# Patient Record
Sex: FEMALE | Race: WHITE | NOT HISPANIC OR LATINO | Employment: FULL TIME | URBAN - METROPOLITAN AREA
[De-identification: names, ages, dates, MRNs, and addresses within clinical notes are randomized per-mention and may not be internally consistent; named-entity substitution may affect disease eponyms.]

---

## 2017-06-09 DIAGNOSIS — L71.0 PERIORAL DERMATITIS: ICD-10-CM

## 2017-06-09 NOTE — TELEPHONE ENCOUNTER
Metrogel -- in history  Last Written Prescription Date: 05/26/2016 -- states D/C reason -- stopped prior to admission or erroneous entry time before that 04/27/2010  Last Fill Quantity: 1 tube,  # refills: 0   Last Office Visit with [facility] or [facility] prescribing provider: 10/03/2016

## 2017-06-12 NOTE — TELEPHONE ENCOUNTER
Reason for Call: Patient called back and would like another call back from triage    Best phone number to reach pt at is: [phone]  Ok to leave a message with medical info? YES    Pharmacy preferred (if calling for a refill): [facility]  [facility]-Patient Representative

## 2017-06-12 NOTE — TELEPHONE ENCOUNTER
Why does pt need this?     Please call pt to discuss     Thank you     Elvia Miller RN, BSN  [facility]

## 2017-06-13 RX ORDER — METRONIDAZOLE 10 MG/G
GEL TOPICAL
Qty: 60 G | Refills: 0 | Status: SHIPPED | OUTPATIENT
Start: 2017-06-13 | End: 2018-08-28

## 2017-06-13 NOTE — TELEPHONE ENCOUNTER
Patient called back.  She said she uses this daily for perioral dermatitis.  She said JV has ordered this in the past for her.      Routing refill request to provider for review/approval because:  Drug not active on patient's medication list          JORDAN Childress, RN, PHN  [facility]

## 2018-08-28 ENCOUNTER — OFFICE VISIT (OUTPATIENT)
Dept: FAMILY MEDICINE | Facility: CLINIC | Age: 38
End: 2018-08-28
Payer: COMMERCIAL

## 2018-08-28 ENCOUNTER — RADIANT APPOINTMENT (OUTPATIENT)
Dept: GENERAL RADIOLOGY | Facility: CLINIC | Age: 38
End: 2018-08-28
Attending: PHYSICIAN ASSISTANT
Payer: COMMERCIAL

## 2018-08-28 VITALS
WEIGHT: 101 LBS | DIASTOLIC BLOOD PRESSURE: 80 MMHG | HEIGHT: 64 IN | SYSTOLIC BLOOD PRESSURE: 118 MMHG | OXYGEN SATURATION: 100 % | BODY MASS INDEX: 17.24 KG/M2 | HEART RATE: 94 BPM | TEMPERATURE: 98.6 F

## 2018-08-28 DIAGNOSIS — L71.0 PERIORAL DERMATITIS: ICD-10-CM

## 2018-08-28 DIAGNOSIS — S99.922A FOOT INJURY, LEFT, INITIAL ENCOUNTER: ICD-10-CM

## 2018-08-28 DIAGNOSIS — S99.922A FOOT INJURY, LEFT, INITIAL ENCOUNTER: Primary | ICD-10-CM

## 2018-08-28 PROCEDURE — 99213 OFFICE O/P EST LOW 20 MIN: CPT | Performed by: PHYSICIAN ASSISTANT

## 2018-08-28 PROCEDURE — 73630 X-RAY EXAM OF FOOT: CPT | Mod: LT

## 2018-08-28 RX ORDER — METRONIDAZOLE 10 MG/G
GEL TOPICAL
Qty: 60 G | Refills: 0 | Status: ON HOLD | OUTPATIENT
Start: 2018-08-28 | End: 2023-11-13

## 2018-08-28 NOTE — PROGRESS NOTES
"  SUBJECTIVE:                                                    Anna Smith is a 38 year old female who presents to clinic today for the following health issues:      Joint Pain    Onset: this morning    Description:   Location: left foot  Character: Sharp and Dull ache    Intensity: moderate    Progression of Symptoms: worse    Accompanying Signs & Symptoms:  Other symptoms: radiation of pain when moves toes, weakness of foot, swelling and redness    History:   Previous similar pain: YES      Precipitating factors:   Trauma or overuse: YES- at work picked up display at work was upside down and dropped a 30 pound metal piece on foot    Alleviating factors:  Improved by: ice    Therapies Tried and outcome: above-minor relief    Problem list and histories reviewed & adjusted, as indicated.  Additional history: as documented      ROS:  Constitutional, HEENT, cardiovascular, pulmonary, GI, , musculoskeletal, neuro, skin, endocrine and psych systems are negative, except as otherwise noted.    OBJECTIVE:                                                    /80 (BP Location: Right arm, Patient Position: Chair, Cuff Size: Adult Regular)  Pulse 94  Temp 98.6  F (37  C) (Oral)  Ht 5' 4\" (1.626 m)  Wt 101 lb (45.8 kg)  SpO2 100%  Breastfeeding? No  BMI 17.34 kg/m2  Body mass index is 17.34 kg/(m^2).  GENERAL: healthy, alert and no distress  EYES: Eyes grossly normal to inspection, PERRL and conjunctivae and sclerae normal  MS: no gross musculoskeletal defects noted, trace edema noted  SKIN: no suspicious lesions or rashes, scrape of skin on top of affected foot no signs of infection noted.    NEURO: Normal strength and tone, mentation intact and speech normal  PSYCH: mentation appears normal, affect normal/bright    Diagnostic Test Results:  Xray - Unremarkable     ASSESSMENT/PLAN:                                                      Anna was seen today for musculoskeletal problem.    Diagnoses and all " orders for this visit:    Foot injury, left, initial encounter  -     XR Foot Left G/E 3 Views; Future    Perioral dermatitis  -     metroNIDAZOLE (METROGEL) 1 % gel; APPLY TOPICALLY DAILY TO TWICE DAILY FOR PERIORAL DERMATITIS      - Patient has normal range of motion and sensation on affected foot.    - XR is unremarkable, no fractures seen.    - Home cares discussed.   - Patient to followup if symptoms do not improve.  She should be seen sooner if needed.     Followup: Return if symptoms worsen or fail to improve.     -- I have discussed the patient's diagnosis, and my plan of treatment with the patient and/or family. Patient is aware to followup if symptoms do not improve.  Patient has been advised to be seen sooner or seek more immediate care if symptoms change or worsen.  Patient agrees with and understands the plan today.     See Patient Instructions        Kusum Foss PA-C    [facility] [place]

## 2018-08-28 NOTE — MR AVS SNAPSHOT
"              After Visit Summary   2018    Anna Smith    MRN: [id number]           Patient Information     Date Of Birth          1980        Visit Information        Provider Department      2018 2:20 PM Kusum Foss PA-C [facility]        Today's Diagnoses     Foot injury, left, initial encounter    -  1    Perioral dermatitis           Follow-ups after your visit        Follow-up notes from your care team     Return if symptoms worsen or fail to improve.      Who to contact     If you have questions or need follow up information about today's clinic visit or your schedule please contact [facility] directly at [phone].  Normal or non-critical lab and imaging results will be communicated to you by MyChart, letter or phone within 4 business days after the clinic has received the results. If you do not hear from us within 7 days, please contact the clinic through MyChart or phone. If you have a critical or abnormal lab result, we will notify you by phone as soon as possible.  Submit refill requests through [employer] or call your pharmacy and they will forward the refill request to us. Please allow 3 business days for your refill to be completed.          Additional Information About Your Visit        MyChart Information     [employer] lets you send messages to your doctor, view your test results, renew your prescriptions, schedule appointments and more. To sign up, go to [email or web address] . Click on \"Log in\" on the left side of the screen, which will take you to the Welcome page. Then click on \"Sign up Now\" on the right side of the page.     You will be asked to enter the access code listed below, as well as some personal information. Please follow the directions to create your username and password.     Your access code is: [email or web address]  Expires: 2018  1:30 PM     Your access code will  in 90 days. If you need help or a new code, " "please call your [place] clinic or [phone].        Care EveryWhere ID     This is your Care EveryWhere ID. This could be used by other organizations to access your [place] medical records  [id number]        Your Vitals Were     Pulse Temperature Height Pulse Oximetry Breastfeeding? BMI (Body Mass Index)    94 98.6  F (37  C) (Oral) 5' 4\" (1.626 m) 100% No 17.34 kg/m2       Blood Pressure from Last 3 Encounters:   08/28/18 118/80   10/03/16 118/78   06/01/16 124/89    Weight from Last 3 Encounters:   08/28/18 101 lb (45.8 kg)   10/03/16 102 lb (46.3 kg)   05/26/16 100 lb 3.2 oz (45.5 kg)                 Where to get your medicines      These medications were sent to [facility] [address] [place] [address] AT [facility] [address] [address]    Hours:  24-hours Phone:  [phone]     metroNIDAZOLE 1 % gel          Primary Care Provider Office Phone # Fax #    Roberto Jones [phone] [address] [facility] [address]        Equal Access to Services     CHIO PARISI AH: Brittany gordono Soshaunaali, waaxda luqadaha, qaybta kaalmada adeegyada, law june. So [facility] [phone].    ATENCIÓN: Si habla español, tiene a camacho disposición servicios gratuitos de asistencia lingüística. ame al [phone].    We comply with applicable federal civil rights laws and [place] laws. We do not discriminate on the basis of race, color, national origin, age, disability, sex, sexual orientation, or gender identity.            Thank you!     Thank you for choosing [facility]  for your care. Our goal is always to provide you with excellent care. Hearing back from our patients is one way we can continue to improve our services. Please take a few minutes to complete the written survey that you may receive in the mail after your visit with us. Thank you!             Your Updated " Medication List - Protect others around you: Learn how to safely use, store and throw away your medicines at www.disposemymeds.org.          This list is accurate as of 8/28/18 11:59 PM.  Always use your most recent med list.                   Brand Name Dispense Instructions for use Diagnosis    metroNIDAZOLE 1 % gel    METROGEL    60 g    APPLY TOPICALLY DAILY TO TWICE DAILY FOR PERIORAL DERMATITIS    Perioral dermatitis

## 2018-11-07 ENCOUNTER — OFFICE VISIT (OUTPATIENT)
Dept: FAMILY MEDICINE | Facility: CLINIC | Age: 38
End: 2018-11-07
Payer: COMMERCIAL

## 2018-11-07 VITALS
TEMPERATURE: 98.6 F | WEIGHT: 100.2 LBS | HEIGHT: 65 IN | OXYGEN SATURATION: 99 % | HEART RATE: 84 BPM | DIASTOLIC BLOOD PRESSURE: 80 MMHG | SYSTOLIC BLOOD PRESSURE: 130 MMHG | BODY MASS INDEX: 16.69 KG/M2

## 2018-11-07 DIAGNOSIS — Z12.4 SCREENING FOR MALIGNANT NEOPLASM OF CERVIX: ICD-10-CM

## 2018-11-07 DIAGNOSIS — Z83.49 FAMILY HISTORY OF THYROID DISEASE: ICD-10-CM

## 2018-11-07 DIAGNOSIS — Z11.4 SCREENING FOR HIV (HUMAN IMMUNODEFICIENCY VIRUS): ICD-10-CM

## 2018-11-07 DIAGNOSIS — Z23 NEED FOR PROPHYLACTIC VACCINATION WITH TETANUS-DIPHTHERIA (TD): ICD-10-CM

## 2018-11-07 DIAGNOSIS — Z00.01 ENCOUNTER FOR ROUTINE ADULT MEDICAL EXAM WITH ABNORMAL FINDINGS: Primary | ICD-10-CM

## 2018-11-07 DIAGNOSIS — Z13.6 CARDIOVASCULAR SCREENING; LDL GOAL LESS THAN 160: ICD-10-CM

## 2018-11-07 DIAGNOSIS — Z23 NEED FOR PROPHYLACTIC VACCINATION AND INOCULATION AGAINST INFLUENZA: ICD-10-CM

## 2018-11-07 LAB
ERYTHROCYTE [DISTWIDTH] IN BLOOD BY AUTOMATED COUNT: 12.7 % (ref 10–15)
HCT VFR BLD AUTO: 45.1 % (ref 35–47)
HGB BLD-MCNC: 15.1 G/DL (ref 11.7–15.7)
MCH RBC QN AUTO: 30.1 PG (ref 26.5–33)
MCHC RBC AUTO-ENTMCNC: 33.5 G/DL (ref 31.5–36.5)
MCV RBC AUTO: 90 FL (ref 78–100)
PLATELET # BLD AUTO: 243 10E9/L (ref 150–450)
RBC # BLD AUTO: 5.01 10E12/L (ref 3.8–5.2)
WBC # BLD AUTO: 4.3 10E9/L (ref 4–11)

## 2018-11-07 PROCEDURE — 90471 IMMUNIZATION ADMIN: CPT | Performed by: FAMILY MEDICINE

## 2018-11-07 PROCEDURE — G0145 SCR C/V CYTO,THINLAYER,RESCR: HCPCS | Performed by: FAMILY MEDICINE

## 2018-11-07 PROCEDURE — 84443 ASSAY THYROID STIM HORMONE: CPT | Performed by: FAMILY MEDICINE

## 2018-11-07 PROCEDURE — 87389 HIV-1 AG W/HIV-1&-2 AB AG IA: CPT | Performed by: FAMILY MEDICINE

## 2018-11-07 PROCEDURE — 80061 LIPID PANEL: CPT | Performed by: FAMILY MEDICINE

## 2018-11-07 PROCEDURE — 87624 HPV HI-RISK TYP POOLED RSLT: CPT | Performed by: FAMILY MEDICINE

## 2018-11-07 PROCEDURE — 90715 TDAP VACCINE 7 YRS/> IM: CPT | Performed by: FAMILY MEDICINE

## 2018-11-07 PROCEDURE — 99395 PREV VISIT EST AGE 18-39: CPT | Mod: 25 | Performed by: FAMILY MEDICINE

## 2018-11-07 PROCEDURE — 85027 COMPLETE CBC AUTOMATED: CPT | Performed by: FAMILY MEDICINE

## 2018-11-07 PROCEDURE — 36415 COLL VENOUS BLD VENIPUNCTURE: CPT | Performed by: FAMILY MEDICINE

## 2018-11-07 PROCEDURE — 80053 COMPREHEN METABOLIC PANEL: CPT | Performed by: FAMILY MEDICINE

## 2018-11-07 ASSESSMENT — ANXIETY QUESTIONNAIRES
6. BECOMING EASILY ANNOYED OR IRRITABLE: NOT AT ALL
3. WORRYING TOO MUCH ABOUT DIFFERENT THINGS: NOT AT ALL
GAD7 TOTAL SCORE: 0
5. BEING SO RESTLESS THAT IT IS HARD TO SIT STILL: NOT AT ALL
2. NOT BEING ABLE TO STOP OR CONTROL WORRYING: NOT AT ALL
7. FEELING AFRAID AS IF SOMETHING AWFUL MIGHT HAPPEN: NOT AT ALL
1. FEELING NERVOUS, ANXIOUS, OR ON EDGE: NOT AT ALL

## 2018-11-07 ASSESSMENT — PATIENT HEALTH QUESTIONNAIRE - PHQ9
SUM OF ALL RESPONSES TO PHQ QUESTIONS 1-9: 0
5. POOR APPETITE OR OVEREATING: NOT AT ALL

## 2018-11-07 NOTE — PROGRESS NOTES
SUBJECTIVE:   CC: Anna Smith is an 38 year old woman who presents for preventive health visit.     Healthy Habits:    Do you get at least three servings of calcium containing foods daily (dairy, green leafy vegetables, etc.)? Yes, tries to    Amount of exercise or daily activities, outside of work: walking and/or running    Problems taking medications regularly No    Medication side effects: No    Have you had an eye exam in the past two years? no    Do you see a dentist twice per year? yes    Do you have sleep apnea, excessive snoring or daytime drowsiness?no      Today's PHQ-2 Score:   PHQ-2 ( 1999 Pfizer) 11/7/2018 8/28/2018   Q1: Little interest or pleasure in doing things 0 0   Q2: Feeling down, depressed or hopeless 0 0   PHQ-2 Score 0 0       Abuse: Current or Past(Physical, Sexual or Emotional)- No  Do you feel safe in your environment - Yes      Social History   Substance Use Topics     Smoking status: Never Smoker     Smokeless tobacco: Never Used     Alcohol use 2.0 oz/week      Comment: 2 per week avg     If you drink alcohol do you typically have >3 drinks per day or >7 drinks per week? No                     Reviewed orders with patient.  Reviewed health maintenance and updated orders accordingly - Yes  BP Readings from Last 3 Encounters:   11/07/18 130/80   08/28/18 118/80   10/03/16 118/78    Wt Readings from Last 3 Encounters:   11/07/18 100 lb 3.2 oz (45.5 kg)   08/28/18 101 lb (45.8 kg)   10/03/16 102 lb (46.3 kg)                  Patient Active Problem List   Diagnosis     CARDIOVASCULAR SCREENING; LDL GOAL LESS THAN 160     Perioral dermatitis     Family history of thyroid disease     Past Surgical History:   Procedure Laterality Date     NO HISTORY OF SURGERY         Social History   Substance Use Topics     Smoking status: Never Smoker     Smokeless tobacco: Never Used     Alcohol use 2.0 oz/week      Comment: 2 per week avg     Family History   Problem Relation Age of Onset      Thyroid Disease Mother      hyperthyroid     HEART DISEASE Father 64     MI at age 64      C.A.D. Paternal Grandfather      Cancer - colorectal Paternal Aunt      Thyroid Disease Sister 33     hypothyroid         Current Outpatient Prescriptions   Medication Sig Dispense Refill     metroNIDAZOLE (METROGEL) 1 % gel APPLY TOPICALLY DAILY TO TWICE DAILY FOR PERIORAL DERMATITIS 60 g 0     Allergies   Allergen Reactions     Erythromycin      Metronidazole      Oral = Rash and burning, topical = fine      No lab results found.     Mammogram not appropriate for this patient based on age.    No results found.      History of abnormal Pap smear: NO - age 30- 65 PAP every 3 years recommended  PAP / HPV 2010   PAP NIL     Reviewed and updated as needed this visit by clinical staff  Tobacco  Allergies  Meds  Med Hx  Surg Hx  Fam Hx  Soc Hx        Reviewed and updated as needed this visit by Provider  Tobacco  Fam Hx  Soc Hx       Past Medical History:   Diagnosis Date     NO ACTIVE PROBLEMS       Past Surgical History:   Procedure Laterality Date     NO HISTORY OF SURGERY       Obstetric History       T0      L0     SAB0   TAB0   Ectopic0   Multiple0   Live Births0           ROS:  CONSTITUTIONAL: NEGATIVE for fever, chills, change in weight  INTEGUMENTARU/SKIN: NEGATIVE for worrisome rashes, moles or lesions  EYES: NEGATIVE for vision changes or irritation  ENT: NEGATIVE for ear, mouth and throat problems  RESP: NEGATIVE for significant cough or SOB  BREAST: NEGATIVE for masses, tenderness or discharge  CV: NEGATIVE for chest pain, palpitations or peripheral edema  GI: NEGATIVE for nausea, abdominal pain, heartburn, or change in bowel habits  : NEGATIVE for unusual urinary or vaginal symptoms. Periods are regular.  MUSCULOSKELETAL: NEGATIVE for significant arthralgias or myalgia  NEURO: NEGATIVE for weakness, dizziness or paresthesias  ENDOCRINE: NEGATIVE for temperature intolerance, skin/hair  "changes  HEME/ALLERGY/IMMUNE: NEGATIVE for bleeding problems  PSYCHIATRIC: NEGATIVE for changes in mood or affect    OBJECTIVE:   /80 (BP Location: Right arm, Patient Position: Chair, Cuff Size: Adult Regular)  Pulse 84  Temp 98.6  F (37  C) (Oral)  Ht 5' 4.5\" (1.638 m)  Wt 100 lb 3.2 oz (45.5 kg)  LMP 10/12/2018 (Exact Date)  SpO2 99%  BMI 16.93 kg/m2  EXAM:  GENERAL: healthy, alert and no distress  EYES: Eyes grossly normal to inspection, PERRL and conjunctivae and sclerae normal  HENT: ear canals and TM's normal, nose and mouth without ulcers or lesions  NECK: no adenopathy, no asymmetry, masses, or scars and thyroid normal to palpation  RESP: lungs clear to auscultation - no rales, rhonchi or wheezes  BREAST: normal without masses, tenderness or nipple discharge and no palpable axillary masses or adenopathy  CV: regular rate and rhythm, normal S1 S2, no S3 or S4, no murmur, click or rub, no peripheral edema and peripheral pulses strong  ABDOMEN: soft, nontender, no hepatosplenomegaly, no masses and bowel sounds normal   (female): normal female external genitalia, normal urethral meatus, vaginal mucosa pink, moist, well rugated, and normal cervix/adnexa/uterus without masses or discharge  MS: no gross musculoskeletal defects noted, no edema  SKIN: no suspicious lesions or rashes  NEURO: Normal strength and tone, mentation intact and speech normal  PSYCH: mentation appears normal, affect normal/bright    See [facility] orders.     ASSESSMENT/PLAN:       ICD-10-CM    1. Encounter for routine adult medical exam with abnormal findings Z00.01 JUST IN CASE   2. CARDIOVASCULAR SCREENING; LDL GOAL LESS THAN 160 Z13.6 Lipid panel reflex to direct LDL Fasting     Comprehensive metabolic panel     CBC with platelets     TSH with free T4 reflex     JUST IN CASE   3. Screening for malignant neoplasm of cervix Z12.4 Pap imaged thin layer screen with HPV - recommended age 30 - 65 years (select HPV order below)     " "HPV High Risk Types DNA Cervical   4. Screening for HIV (human immunodeficiency virus) Z11.4 HIV Screening   5. Need for prophylactic vaccination and inoculation against influenza Z23    6. Need for prophylactic vaccination with tetanus-diphtheria (Td) Z23 TDAP VACCINE (ADACEL)          ADMIN VACCINE, FIRST   7. Family history of thyroid disease Z83.49        COUNSELING:   Reviewed preventive health counseling, as reflected in patient instructions    BP Readings from Last 1 Encounters:   11/07/18 130/80     Estimated body mass index is 16.93 kg/(m^2) as calculated from the following:    Height as of this encounter: 5' 4.5\" (1.638 m).    Weight as of this encounter: 100 lb 3.2 oz (45.5 kg).    BP Screening:   Last 3 BP Readings:    BP Readings from Last 3 Encounters:   11/07/18 130/80   08/28/18 118/80   10/03/16 118/78       The following was recommended to the patient:  Re-screen BP within a year and recommended lifestyle modifications       For the bumps around your forehead: Neutralyze Step 2-clearing serum - blue green clear color - use after cleansing - available at [employer].        reports that she has never smoked. She has never used smokeless tobacco.      Counseling Resources:  ATP IV Guidelines  Pooled Cohorts Equation Calculator  Breast Cancer Risk Calculator  FRAX Risk Assessment  ICSI Preventive Guidelines  Dietary Guidelines for Americans, 2010  USDA's MyPlate  ASA Prophylaxis  Lung CA Screening    Maribeth Sanches MD  [facility] [place]  "

## 2018-11-07 NOTE — MR AVS SNAPSHOT
After Visit Summary   11/7/2018    Anna Smith    MRN: [id number]           Patient Information     Date Of Birth          1980        Visit Information        Provider Department      11/7/2018 10:00 AM Maribeth Sanches MD [facility]        Today's Diagnoses     Encounter for routine adult medical exam with abnormal findings    -  1    CARDIOVASCULAR SCREENING; LDL GOAL LESS THAN 160        Screening for malignant neoplasm of cervix        Screening for HIV (human immunodeficiency virus)        Need for prophylactic vaccination and inoculation against influenza        Need for prophylactic vaccination with tetanus-diphtheria (Td)        Family history of thyroid disease          Care Instructions    For the bumps around your forehead: Neutralyze brand Step 2-clearing serum - blue green clear color - use after cleansing - available at [employer] - also available at Neutralyze.com.     Start a multivitamin or prenatal vitamin with 800-1000mcg of folic acid daily.         Preventive Health Recommendations  Female Ages 26 - 39  Yearly exam:   See your health care provider every year in order to    Review health changes.     Discuss preventive care.      Review your medicines if you your doctor has prescribed any.    Until age 30: Get a Pap test every three years (more often if you have had an abnormal result).    After age 30: Talk to your doctor about whether you should have a Pap test every 3 years or have a Pap test with HPV screening every 5 years.   You do not need a Pap test if your uterus was removed (hysterectomy) and you have not had cancer.  You should be tested each year for STDs (sexually transmitted diseases), if you're at risk.   Talk to your provider about how often to have your cholesterol checked.  If you are at risk for diabetes, you should have a diabetes test (fasting glucose).  Shots: Get a flu shot each year. Get a tetanus shot every 10 years.    Nutrition:     Eat at least 5 servings of fruits and vegetables each day.    Eat whole-grain bread, whole-wheat pasta and brown rice instead of white grains and rice.    Get adequate Calcium and Vitamin D.     Lifestyle    Exercise at least 150 minutes a week (30 minutes a day, 5 days of the week). This will help you control your weight and prevent disease.    Limit alcohol to one drink per day.    No smoking.     Wear sunscreen to prevent skin cancer.    See your dentist every six months for an exam and cleaning.               Thank you for choosing [facility]  for your Health Care. It was a pleasure seeing you at your visit today. Please contact us with any questions or concerns you may have.                   Maribeth Sanches MD                                  To reach your [facility] care team after hours call:   [phone]    Our clinic hours are:     Monday- 7:30 am - 7:00 pm                             Tuesday through Friday- 7:30 am - 5:00 pm                                        Saturday- 8:00 am - 12:00 pm                  Phone:  [phone]    Our pharmacy hours are:     Monday  8:00 am to 7:00 pm      Tuesday through Friday 8:00am to 6:00pm                        Saturday - 9:00 am to 1:00 pm      Sunday : Closed.              Phone:  [phone]      There is also information available at our web site:  [email or web address]    If your provider ordered any lab tests and you do not receive the results within 10 business days, please call the clinic.    If you need a medication refill please contact your pharmacy.  Please allow 2 business days for your refill to be completed.    Our clinic offers telephone visits and e visits.  Please ask one of your team members to explain more.      Use [employer] (secure email communication and access to your chart) to send your primary care provider a message or make an appointment. Ask someone on your Team how to sign up  "for [employer].                         Follow-ups after your visit        Follow-up notes from your care team     Return in about 1 year (around 2019) for Physical Exam, Lab Work, Routine Visit.      Who to contact     If you have questions or need follow up information about today's clinic visit or your schedule please contact [facility] directly at [phone].  Normal or non-critical lab and imaging results will be communicated to you by MyChart, letter or phone within 4 business days after the clinic has received the results. If you do not hear from us within 7 days, please contact the clinic through [employer] or phone. If you have a critical or abnormal lab result, we will notify you by phone as soon as possible.  Submit refill requests through [employer] or call your pharmacy and they will forward the refill request to us. Please allow 3 business days for your refill to be completed.          Additional Information About Your Visit        [employer] Information     [employer] lets you send messages to your doctor, view your test results, renew your prescriptions, schedule appointments and more. To sign up, go to [email or web address] . Click on \"Log in\" on the left side of the screen, which will take you to the Welcome page. Then click on \"Sign up Now\" on the right side of the page.     You will be asked to enter the access code listed below, as well as some personal information. Please follow the directions to create your username and password.     Your access code is: [email or web address]  Expires: 2019 11:06 AM     Your access code will  in 90 days. If you need help or a new code, please call your [place] clinic or [phone].        Care EveryWhere ID     This is your Care EveryWhere ID. This could be used by other organizations to access your [place] medical records  [id number]        Your Vitals Were     Pulse Temperature Height Last Period Pulse Oximetry BMI (Body Mass Index)    84 " "98.6  F (37  C) (Oral) 5' 4.5\" (1.638 m) 10/12/2018 (Exact Date) 99% 16.93 kg/m2       Blood Pressure from Last 3 Encounters:   11/07/18 130/80   08/28/18 118/80   10/03/16 118/78    Weight from Last 3 Encounters:   11/07/18 100 lb 3.2 oz (45.5 kg)   08/28/18 101 lb (45.8 kg)   10/03/16 102 lb (46.3 kg)              We Performed the Following          ADMIN VACCINE, FIRST     CBC with platelets     Comprehensive metabolic panel     HIV Screening     HPV High Risk Types DNA Cervical     JUST IN CASE     Lipid panel reflex to direct LDL Fasting     Pap imaged thin layer screen with HPV - recommended age 30 - 65 years (select HPV order below)     TDAP VACCINE (ADACEL)     TSH with free T4 reflex        Primary Care Provider Office Phone # Fax #    Roberto Jones [phone]       [address] [facility] [address]        Equal Access to Services     YOVANNY PARISI : Hadii aad ku hadasho Soomaali, waaxda luqadaha, qaybta kaalmada adeegyada, waxay jadonin hayjacquelin yap . So [facility] [phone].    ATENCIÓN: Si habla español, tiene a camacho disposición servicios gratuitos de asistencia lingüística. Llame al [phone].    We comply with applicable federal civil rights laws and [place] laws. We do not discriminate on the basis of race, color, national origin, age, disability, sex, sexual orientation, or gender identity.            Thank you!     Thank you for choosing [facility]  for your care. Our goal is always to provide you with excellent care. Hearing back from our patients is one way we can continue to improve our services. Please take a few minutes to complete the written survey that you may receive in the mail after your visit with us. Thank you!             Your Updated Medication List - Protect others around you: Learn how to safely use, store and throw away your medicines at www.disposemymeds.org.          This list is accurate as of 11/7/18 11:18 AM.  Always use " your most recent med list.                   Brand Name Dispense Instructions for use Diagnosis    metroNIDAZOLE 1 % gel    METROGEL    60 g    APPLY TOPICALLY DAILY TO TWICE DAILY FOR PERIORAL DERMATITIS    Perioral dermatitis

## 2018-11-07 NOTE — PATIENT INSTRUCTIONS
For the bumps around your forehead: Blogger[employer] brand Step 2-clearing serum - blue green clear color - use after cleansing - available at Amazon.com - also available at Neutralyze.com.     Start a multivitamin or prenatal vitamin with 800-1000mcg of folic acid daily.         Preventive Health Recommendations  Female Ages 26 - 39  Yearly exam:   See your health care provider every year in order to    Review health changes.     Discuss preventive care.      Review your medicines if you your doctor has prescribed any.    Until age 30: Get a Pap test every three years (more often if you have had an abnormal result).    After age 30: Talk to your doctor about whether you should have a Pap test every 3 years or have a Pap test with HPV screening every 5 years.   You do not need a Pap test if your uterus was removed (hysterectomy) and you have not had cancer.  You should be tested each year for STDs (sexually transmitted diseases), if you're at risk.   Talk to your provider about how often to have your cholesterol checked.  If you are at risk for diabetes, you should have a diabetes test (fasting glucose).  Shots: Get a flu shot each year. Get a tetanus shot every 10 years.   Nutrition:     Eat at least 5 servings of fruits and vegetables each day.    Eat whole-grain bread, whole-wheat pasta and brown rice instead of white grains and rice.    Get adequate Calcium and Vitamin D.     Lifestyle    Exercise at least 150 minutes a week (30 minutes a day, 5 days of the week). This will help you control your weight and prevent disease.    Limit alcohol to one drink per day.    No smoking.     Wear sunscreen to prevent skin cancer.    See your dentist every six months for an exam and cleaning.               Thank you for choosing [facility]  for your Health Care. It was a pleasure seeing you at your visit today. Please contact us with any questions or concerns you may have.                   Maribeth ROBIN  MD My[place]                                  To reach your [facility] team after hours call:   [phone]    Our clinic hours are:     Monday- 7:30 am - 7:00 pm                             Tuesday through Friday- 7:30 am - 5:00 pm                                        Saturday- 8:00 am - 12:00 pm                  Phone:  [phone]    Our pharmacy hours are:     Monday  8:00 am to 7:00 pm      Tuesday through Friday 8:00am to 6:00pm                        Saturday - 9:00 am to 1:00 pm      Sunday : Closed.              Phone:  [phone]      There is also information available at our web site:  [email or web address]    If your provider ordered any lab tests and you do not receive the results within 10 business days, please call the clinic.    If you need a medication refill please contact your pharmacy.  Please allow 2 business days for your refill to be completed.    Our clinic offers telephone visits and e visits.  Please ask one of your team members to explain more.      Use Fotoshkolahart (secure email communication and access to your chart) to send your primary care provider a message or make an appointment. Ask someone on your Team how to sign up for [employer].

## 2018-11-08 LAB
ALBUMIN SERPL-MCNC: 4.5 G/DL (ref 3.4–5)
ALP SERPL-CCNC: 38 U/L (ref 40–150)
ALT SERPL W P-5'-P-CCNC: 16 U/L (ref 0–50)
ANION GAP SERPL CALCULATED.3IONS-SCNC: 9 MMOL/L (ref 3–14)
AST SERPL W P-5'-P-CCNC: 16 U/L (ref 0–45)
BILIRUB SERPL-MCNC: 0.7 MG/DL (ref 0.2–1.3)
BUN SERPL-MCNC: 10 MG/DL (ref 7–30)
CALCIUM SERPL-MCNC: 9.2 MG/DL (ref 8.5–10.1)
CHLORIDE SERPL-SCNC: 109 MMOL/L (ref 94–109)
CHOLEST SERPL-MCNC: 216 MG/DL
CO2 SERPL-SCNC: 21 MMOL/L (ref 20–32)
CREAT SERPL-MCNC: 0.82 MG/DL (ref 0.52–1.04)
GFR SERPL CREATININE-BSD FRML MDRD: 77 ML/MIN/1.7M2
GLUCOSE SERPL-MCNC: 75 MG/DL (ref 70–99)
HDLC SERPL-MCNC: 98 MG/DL
HIV 1+2 AB+HIV1 P24 AG SERPL QL IA: NONREACTIVE
LDLC SERPL CALC-MCNC: 104 MG/DL
NONHDLC SERPL-MCNC: 118 MG/DL
POTASSIUM SERPL-SCNC: 4.2 MMOL/L (ref 3.4–5.3)
PROT SERPL-MCNC: 7.8 G/DL (ref 6.8–8.8)
SODIUM SERPL-SCNC: 139 MMOL/L (ref 133–144)
TRIGL SERPL-MCNC: 71 MG/DL
TSH SERPL DL<=0.005 MIU/L-ACNC: 1.53 MU/L (ref 0.4–4)

## 2018-11-08 ASSESSMENT — ANXIETY QUESTIONNAIRES: GAD7 TOTAL SCORE: 0

## 2018-11-09 LAB
COPATH REPORT: NORMAL
PAP: NORMAL

## 2018-11-13 LAB
FINAL DIAGNOSIS: NORMAL
HPV HR 12 DNA CVX QL NAA+PROBE: NEGATIVE
HPV16 DNA SPEC QL NAA+PROBE: NEGATIVE
HPV18 DNA SPEC QL NAA+PROBE: NEGATIVE
SPECIMEN DESCRIPTION: NORMAL
SPECIMEN SOURCE CVX/VAG CYTO: NORMAL

## 2019-12-15 ENCOUNTER — HEALTH MAINTENANCE LETTER (OUTPATIENT)
Age: 39
End: 2019-12-15

## 2020-08-16 ENCOUNTER — MYC MEDICAL ADVICE (OUTPATIENT)
Dept: FAMILY MEDICINE | Facility: CLINIC | Age: 40
End: 2020-08-16

## 2020-08-17 NOTE — TELEPHONE ENCOUNTER
Next 5 appointments (look out 90 days)    Aug 26, 2020 10:30 AM CDT  SHORT with CECIL Watson [facility] ([facility]) [address] [place] [address] [phone]        MyChart sent      Carolina Sargent RN  [facility]

## 2020-08-17 NOTE — TELEPHONE ENCOUNTER
Intelligent Mobile Support message sent, awaiting reply.    Vito COLE RN   [facility]

## 2020-08-18 ENCOUNTER — MYC MEDICAL ADVICE (OUTPATIENT)
Dept: FAMILY MEDICINE | Facility: CLINIC | Age: 40
End: 2020-08-18

## 2020-08-19 ENCOUNTER — MYC MEDICAL ADVICE (OUTPATIENT)
Dept: FAMILY MEDICINE | Facility: CLINIC | Age: 40
End: 2020-08-19

## 2020-08-19 DIAGNOSIS — Z31.9 DESIRE FOR PREGNANCY: Primary | ICD-10-CM

## 2020-08-19 DIAGNOSIS — O09.511 PRIMIGRAVIDA OF ADVANCED MATERNAL AGE IN FIRST TRIMESTER: ICD-10-CM

## 2020-08-21 NOTE — TELEPHONE ENCOUNTER
Reviewed pt's mychart messages.   No LMP recorded.   Called pt - first day last menstrual period was 7/18/2020.    Had  positive pregnancy tests since 7/10/2020 daily through 7/15/2020, but started to have a period on 8/18 with spotting, then rapidly  heavier than usual the first two days, then now tapering off per usual period.  Has had not multiple negative pregnancy tests otc since 8/18/2020.  Doesn't want to come in for appt or other lab testing at this time.      Discussed that with her age and desire for successful pregnancy and just started trying this past month, that I'd like to have her see ob/gyn to ensure successful conception and start to her next pregnancy. Referred to ob/gyn specialists for preconception counseling.      See pt instructions for preconception counseling as well. Pt desires to cancel 8/26/ 2020 appt with us.

## 2020-08-21 NOTE — PATIENT INSTRUCTIONS
Patient Education     Preparing for Pregnancy  Even before you become pregnant, your health matters to your future baby. Adopt good health habits today. And take care of any medical problems you have before becoming pregnant.  Remember: As soon as you know you are pregnant, get regular prenatal care.   Things to consider  Read through the list below. The more items that describe you, the healthier you may be:    I eat a balanced diet.    I keep physically active.    I have my health problems under control.    My weight is about right.    I don t smoke.    I don t use recreational drugs.    I don t have a drinking problem.  Think about the following:    Who will help you through pregnancy and with childcare?    Do you have health insurance?    Do you have the money needed to cover childcare and other day-to-day child expenses?    Will you be able to take the time you need away from your job for maternity needs and childcare?  Adopt a healthy lifestyle  Each of the following tips can improve your health as you prepare for pregnancy:    Take folic acid 400 to 800 micrograms or a prenatal vitamin daily.     Eat a healthy, well-balanced diet.    Exercise 3 or more times a week and at least 150 minutes weekly.    Get within 15 pounds of your ideal weight.  The first weeks of pregnancy are the most important time in a baby s development. Before you become pregnant:    Don t use recreational drugs.    Don t drink alcohol.    Don t smoke.    Get recommended vaccines.  Working with your healthcare provider  Your healthcare provider can help answer any questions you may have. Do you know when to stop taking birth control pills? Are any over-the-counter medicines safe for pregnant women? You can also ask about special care you may need if you have any of the following:    Sexually transmitted diseases (STDs), like herpes or chlamydia    Diabetes    High blood pressure    Other chronic health problems   Date Last Reviewed:  10/1/2017    3846-4649 [employer]. [address] [place] [address], [place] [address]. All rights reserved. This information is not intended as a substitute for professional medical care. Always follow your healthcare professional's instructions.           Patient Education     Preparing for Pregnancy  Even before you become pregnant, your health matters to your future baby. Adopt good health habits today. And take care of any medical problems you have before becoming pregnant.  Remember: As soon as you know you are pregnant, get regular prenatal care.   Things to consider  Read through the list below. The more items that describe you, the healthier you may be:    I eat a balanced diet.    I keep physically active.    I have my health problems under control.    My weight is about right.    I don t smoke.    I don t use recreational drugs.    I don t have a drinking problem.  Think about the following:    Who will help you through pregnancy and with childcare?    Do you have health insurance?    Do you have the money needed to cover childcare and other day-to-day child expenses?    Will you be able to take the time you need away from your job for maternity needs and childcare?  Adopt a healthy lifestyle  Each of the following tips can improve your health as you prepare for pregnancy:    Take folic acid 400 to 800 micrograms or a prenatal vitamin daily.     Eat a healthy, well-balanced diet.    Exercise 3 or more times a week and at least 150 minutes weekly.    Get within 15 pounds of your ideal weight.  The first weeks of pregnancy are the most important time in a baby s development. Before you become pregnant:    Don t use recreational drugs.    Don t drink alcohol.    Don t smoke.    Get recommended vaccines.  Working with your healthcare provider  Your healthcare provider can help answer any questions you may have. Do you know when to stop taking birth control pills? Are any over-the-counter medicines safe  for pregnant women? You can also ask about special care you may need if you have any of the following:    Sexually transmitted diseases (STDs), like herpes or chlamydia    Diabetes    High blood pressure    Other chronic health problems   Date Last Reviewed: 10/1/2017    [id number] [employer]. [address] [place] [address]. All rights reserved. This information is not intended as a substitute for professional medical care. Always follow your healthcare professional's instructions.

## 2020-09-03 ENCOUNTER — TRANSFERRED RECORDS (OUTPATIENT)
Dept: HEALTH INFORMATION MANAGEMENT | Facility: CLINIC | Age: 40
End: 2020-09-03

## 2020-09-15 DIAGNOSIS — Z11.59 ENCOUNTER FOR SCREENING FOR OTHER VIRAL DISEASES: Primary | ICD-10-CM

## 2020-09-21 ENCOUNTER — HOSPITAL ENCOUNTER (OUTPATIENT)
Dept: GENERAL RADIOLOGY | Facility: CLINIC | Age: 40
Discharge: HOME | End: 2020-09-21
Attending: OBSTETRICS & GYNECOLOGY | Admitting: OBSTETRICS & GYNECOLOGY
Payer: COMMERCIAL

## 2020-09-21 DIAGNOSIS — Z31.49 INVESTIGATION AND TESTING FOR PROCREATION MANAGEMENT: ICD-10-CM

## 2020-09-21 PROCEDURE — 25500064 ZZH RX 255 OP 636: Performed by: OBSTETRICS & GYNECOLOGY

## 2020-09-21 PROCEDURE — 74740 X-RAY FEMALE GENITAL TRACT: CPT

## 2020-09-21 RX ORDER — IOPAMIDOL 510 MG/ML
50 INJECTION, SOLUTION INTRAVASCULAR ONCE
Status: COMPLETED | OUTPATIENT
Start: 2020-09-21 | End: 2020-09-21

## 2020-09-21 RX ADMIN — IOPAMIDOL 50 ML: 510 INJECTION, SOLUTION INTRAVASCULAR at 10:42

## 2020-09-24 ENCOUNTER — TRANSFERRED RECORDS (OUTPATIENT)
Dept: HEALTH INFORMATION MANAGEMENT | Facility: CLINIC | Age: 40
End: 2020-09-24

## 2020-09-28 ENCOUNTER — E-VISIT (OUTPATIENT)
Dept: FAMILY MEDICINE | Facility: CLINIC | Age: 40
End: 2020-09-28
Payer: COMMERCIAL

## 2020-09-28 DIAGNOSIS — L42 PITYRIASIS ROSEA: Primary | ICD-10-CM

## 2020-09-28 PROCEDURE — 99422 OL DIG E/M SVC 11-20 MIN: CPT | Performed by: FAMILY MEDICINE

## 2020-09-30 NOTE — PATIENT INSTRUCTIONS
Patient Education     Pityriasis Rosea  This is a harmless non-contagious rash. The exact cause is unknown. It is not an allergic reaction, and does not seem to be caused by a viral or fungal infection. Although anyone can get it, it is most often seen in children and young adults ages 10 to 35.  Pityriasis usually resolves on its own without treatment in 2 weeks.  In some people it may take 6 to 8 weeks to clear up.   Home care    For dry skin, use a moisturizing cream. For itchiness, use 1% hydrocortisone cream (no prescription needed) or calamine lotion 2 to 3 times a day.    Exposure to natural sunlight helps some people. It may help fade the rash, but doesn't seem to help the itching. Don't overdo it in the sun, as your skin may be more sensitive than usual. You don t want to burn yourself. Artificial sun lamps, sun beds, and tanning salons are not recommended.    This condition is not contagious. Your child may attend  or school while the rash is present.  Medicines  Talk with your healthcare provider before using any medicines. All medicines have side effects.    Medicines will not get rid of the rash.     Moisturizing skin creams may help.    Antihistamines may help with itching, but they can make you sleepy.    Topical steroids are sometimes used.  Follow-up care  Follow up with your healthcare provider, or as advised. Call your provider if the itching is not controlled by the above suggestions, or if the rash lasts longer than 3 months.  When to seek medical advice  Call your healthcare provider right away if any of these occur:    You develop a rash and are pregnant    Severe itching    Signs of infection in the skin (increasing redness, drainage of pus, yellow-brown scabs)    Fever or other symptoms of a new illness  Date Last Reviewed: 8/1/2016 2000-2019 [employer]. [address] [place] [address], [place] [address]. All rights reserved. This information is not intended as a substitute  for professional medical care. Always follow your healthcare professional's instructions.           Patient Education     Understanding Pityriasis Rosea    Pityriasis rosea is a type of skin rash. It starts with one large round or oval scaly patch called the herald patch, and then causes many more small patches. The rash most often appears on the chest, back, and belly. It can take 1 to 2 months to go away. But once it s gone, it doesn t come back.  How to say it  pit-er-EYE-ah-sis RO-zee-ah   What causes pityriasis rosea?  The cause is not yet known but experts think it may be from a virus. The rash happens most often in people ages 10 to 35, and in pregnant women. If you are pregnant, make sure to tell your healthcare provider about your rash.  Symptoms of pityriasis rosea  In some people, the rash shows up 1 to 2 weeks after symptoms such as headache, sore throat, nausea, stuffy nose, and fever. The rash often starts with one large scaly patch in the shape of a [place] or oval. The patch may be pink or red if you have pale skin. It may be purple, brown, or gray if you have darker skin. It can be 1 to 2 inches wide or larger. It usually appears on the chest or back. This is called a herald or mother patch.  Smaller patches then show up in 1 to 2 weeks on the chest, back, belly, arms, and legs. It can also show up on the neck and face. The rash can form the shape of a Fabrice tree on your back. The patches may itch, especially if your skin gets warmer during exercise or a hot shower. You may also feel tired and achy.  Treatment for pityriasis rosea  The rash should go away without treatment, but it can take 4 to 8 weeks or longer. Once the rash goes away, it doesn t come back.  You can treat your itching with any of these:    Corticosteroid cream or ointment. You can apply this medicine to the rash 2 to 3 times a day, for up to 3 weeks.    Calamine lotion. This is a pink, watery lotion that can help stop  itching.    Antihistamine. This medicine can help reduce itching. You can put it on the skin as a cream or take it by mouth as a pill.    Other anti-itch lotion or cream. Ask your healthcare provider about other anti-itch lotion or cream that can help relieve itching. He or she may prescribe a stronger medicine if drugstore medicine isn t helping you.  If you have severe symptoms, your healthcare provider may treat you with any of the below:    Prednisone. This is an oral steroid medicine. It can help relieve severe itching if needed.    Acyclovir. This is a type of anti-virus medicine. It may help the rash go away sooner in some people.    Ultraviolet light treatment. Exposing the skin to ultraviolet light in the first week can help lessen symptoms.  When to call your healthcare provider  Call your healthcare provider right away if you have any of these:    New symptoms    Rash that lasts for more than 3 months    Symptoms that don t get better in 1 to 2 months, or get worse   Date Last Reviewed: 5/1/2016 2000-2019 [employer]. [address] [place] [address]. All rights reserved. This information is not intended as a substitute for professional medical care. Always follow your healthcare professional's instructions.           Thank you for choosing us for your care. Based on your symptoms and length of illness, I do not think that you need a prescription at this time.  Please follow the care advise I've provided and use the over the counter medications to help relieve your symptoms.   View your full visit summary for details by clicking on the link below.     If you're not feeling better within 2-3 days, please respond to this message and we can consider if a prescription is needed.  You can schedule an appointment right here in [facility], or call [phone]  If the visit is for the same symptoms as your e-visit, we'll refund the cost of your e-visit if seen within seven days.      Thank  you for choosing us for your care. Based on your symptoms and length of illness, I do not think that you need an antibiotic prescription at this time.  Please follow the care advise I ve provided and use the prescribed medication to help relieve your symptoms. View your full visit summary for details by clicking on the link below.     If you re not feeling better within 5-7 days, please respond to this message and we can consider if an antibiotic prescription is needed.  You can schedule an appointment right here in [facility], or call [phone]  If the visit is for the same symptoms as your e-visit, we ll refund the cost of your e-visit if seen within seven days

## 2020-10-22 ENCOUNTER — ALLIED HEALTH/NURSE VISIT (OUTPATIENT)
Dept: NURSING | Facility: CLINIC | Age: 40
End: 2020-10-22
Payer: COMMERCIAL

## 2020-10-22 DIAGNOSIS — Z23 FLU VACCINE NEED: Primary | ICD-10-CM

## 2020-10-22 PROCEDURE — 99207 PR NO CHARGE NURSE ONLY: CPT

## 2020-10-22 PROCEDURE — 90682 RIV4 VACC RECOMBINANT DNA IM: CPT

## 2020-10-22 PROCEDURE — 90471 IMMUNIZATION ADMIN: CPT

## 2021-01-15 ENCOUNTER — HEALTH MAINTENANCE LETTER (OUTPATIENT)
Age: 41
End: 2021-01-15

## 2021-07-09 ENCOUNTER — HOSPITAL ENCOUNTER (OUTPATIENT)
Dept: MAMMOGRAPHY | Facility: CLINIC | Age: 41
Discharge: HOME | End: 2021-07-09
Attending: FAMILY MEDICINE | Admitting: FAMILY MEDICINE
Payer: COMMERCIAL

## 2021-07-09 DIAGNOSIS — Z12.31 ENCOUNTER FOR SCREENING MAMMOGRAM FOR BREAST CANCER: ICD-10-CM

## 2021-07-09 PROCEDURE — 77063 BREAST TOMOSYNTHESIS BI: CPT

## 2021-07-09 NOTE — RESULT ENCOUNTER NOTE
Your mammogram was normal.     Thank you so much for choosing [facility].  Please contact us with any questions that you may have.   We appreciate the opportunity to serve you now and look forward to supporting your healthcare needs for a long time to come!    Most Sincerely,     Maribeth Sanches MD

## 2021-10-24 ENCOUNTER — HEALTH MAINTENANCE LETTER (OUTPATIENT)
Age: 41
End: 2021-10-24

## 2022-02-13 ENCOUNTER — HEALTH MAINTENANCE LETTER (OUTPATIENT)
Age: 42
End: 2022-02-13

## 2022-03-29 ENCOUNTER — LAB REQUISITION (OUTPATIENT)
Dept: LAB | Facility: CLINIC | Age: 42
End: 2022-03-29
Payer: COMMERCIAL

## 2022-03-29 PROCEDURE — 88305 TISSUE EXAM BY PATHOLOGIST: CPT | Mod: TC,ORL | Performed by: OBSTETRICS & GYNECOLOGY

## 2022-03-31 LAB
PATH REPORT.COMMENTS IMP SPEC: NORMAL
PATH REPORT.COMMENTS IMP SPEC: NORMAL
PATH REPORT.FINAL DX SPEC: NORMAL
PATH REPORT.GROSS SPEC: NORMAL
PATH REPORT.MICROSCOPIC SPEC OTHER STN: NORMAL
PATH REPORT.RELEVANT HX SPEC: NORMAL
PHOTO IMAGE: NORMAL

## 2022-03-31 PROCEDURE — 88305 TISSUE EXAM BY PATHOLOGIST: CPT | Mod: 26

## 2022-08-22 ENCOUNTER — HOSPITAL ENCOUNTER (OUTPATIENT)
Dept: MAMMOGRAPHY | Facility: CLINIC | Age: 42
Discharge: HOME | End: 2022-08-22
Attending: FAMILY MEDICINE | Admitting: FAMILY MEDICINE
Payer: COMMERCIAL

## 2022-08-22 DIAGNOSIS — Z12.31 BREAST CANCER SCREENING BY MAMMOGRAM: ICD-10-CM

## 2022-08-22 PROCEDURE — 77067 SCR MAMMO BI INCL CAD: CPT

## 2022-08-30 NOTE — RESULT ENCOUNTER NOTE
Your mammogram was normal.     Thank you so much for choosing [facility].  Please contact us with any questions that you may have.   We appreciate the opportunity to serve you now and look forward to supporting your healthcare needs for a long time to come!    Most Sincerely,     Maribeth Sanches MD

## 2022-10-16 ENCOUNTER — HEALTH MAINTENANCE LETTER (OUTPATIENT)
Age: 42
End: 2022-10-16

## 2023-03-26 ENCOUNTER — HEALTH MAINTENANCE LETTER (OUTPATIENT)
Age: 43
End: 2023-03-26

## 2023-04-18 LAB
HEPATITIS B SURFACE ANTIGEN (EXTERNAL): NEGATIVE
HIV1+2 AB SERPL QL IA: NONREACTIVE
RUBELLA ANTIBODY IGG (EXTERNAL): NORMAL

## 2023-11-08 ENCOUNTER — HOSPITAL ENCOUNTER (INPATIENT)
Facility: CLINIC | Age: 43
LOS: 5 days | Discharge: HOME | End: 2023-11-13
Attending: OBSTETRICS & GYNECOLOGY | Admitting: STUDENT IN AN ORGANIZED HEALTH CARE EDUCATION/TRAINING PROGRAM
Payer: COMMERCIAL

## 2023-11-08 DIAGNOSIS — Z98.891 S/P CESAREAN SECTION: ICD-10-CM

## 2023-11-08 LAB
ABO/RH(D): ABNORMAL
ANTIBODY ID: NORMAL
ANTIBODY SCREEN: POSITIVE
HGB BLD-MCNC: 12 G/DL (ref 11.7–15.7)
HOLD SPECIMEN: NORMAL
SPECIMEN EXPIRATION DATE: ABNORMAL
SPECIMEN EXPIRATION DATE: NORMAL

## 2023-11-08 PROCEDURE — 86850 RBC ANTIBODY SCREEN: CPT | Performed by: OBSTETRICS & GYNECOLOGY

## 2023-11-08 PROCEDURE — 120N000001 HC R&B MED SURG/OB

## 2023-11-08 PROCEDURE — 85018 HEMOGLOBIN: CPT | Performed by: OBSTETRICS & GYNECOLOGY

## 2023-11-08 PROCEDURE — 86901 BLOOD TYPING SEROLOGIC RH(D): CPT | Performed by: OBSTETRICS & GYNECOLOGY

## 2023-11-08 PROCEDURE — 86870 RBC ANTIBODY IDENTIFICATION: CPT | Performed by: OBSTETRICS & GYNECOLOGY

## 2023-11-08 PROCEDURE — 86780 TREPONEMA PALLIDUM: CPT | Performed by: OBSTETRICS & GYNECOLOGY

## 2023-11-08 PROCEDURE — 250N000013 HC RX MED GY IP 250 OP 250 PS 637: Performed by: OBSTETRICS & GYNECOLOGY

## 2023-11-08 PROCEDURE — 36415 COLL VENOUS BLD VENIPUNCTURE: CPT | Performed by: OBSTETRICS & GYNECOLOGY

## 2023-11-08 RX ORDER — ONDANSETRON 4 MG/1
4 TABLET, ORALLY DISINTEGRATING ORAL EVERY 6 HOURS PRN
Status: DISCONTINUED | OUTPATIENT
Start: 2023-11-08 | End: 2023-11-10 | Stop reason: HOSPADM

## 2023-11-08 RX ORDER — NALOXONE HYDROCHLORIDE 0.4 MG/ML
0.4 INJECTION, SOLUTION INTRAMUSCULAR; INTRAVENOUS; SUBCUTANEOUS
Status: DISCONTINUED | OUTPATIENT
Start: 2023-11-08 | End: 2023-11-10 | Stop reason: HOSPADM

## 2023-11-08 RX ORDER — OXYTOCIN 10 [USP'U]/ML
10 INJECTION, SOLUTION INTRAMUSCULAR; INTRAVENOUS
Status: DISCONTINUED | OUTPATIENT
Start: 2023-11-08 | End: 2023-11-10

## 2023-11-08 RX ORDER — PROCHLORPERAZINE MALEATE 5 MG
10 TABLET ORAL EVERY 6 HOURS PRN
Status: DISCONTINUED | OUTPATIENT
Start: 2023-11-08 | End: 2023-11-10 | Stop reason: HOSPADM

## 2023-11-08 RX ORDER — ONDANSETRON 2 MG/ML
4 INJECTION INTRAMUSCULAR; INTRAVENOUS EVERY 6 HOURS PRN
Status: DISCONTINUED | OUTPATIENT
Start: 2023-11-08 | End: 2023-11-10 | Stop reason: HOSPADM

## 2023-11-08 RX ORDER — CITRIC ACID/SODIUM CITRATE 334-500MG
30 SOLUTION, ORAL ORAL
Status: DISCONTINUED | OUTPATIENT
Start: 2023-11-08 | End: 2023-11-10 | Stop reason: HOSPADM

## 2023-11-08 RX ORDER — OXYTOCIN/0.9 % SODIUM CHLORIDE 30/500 ML
100-340 PLASTIC BAG, INJECTION (ML) INTRAVENOUS CONTINUOUS PRN
Status: DISCONTINUED | OUTPATIENT
Start: 2023-11-08 | End: 2023-11-10

## 2023-11-08 RX ORDER — HYDROXYZINE HYDROCHLORIDE 25 MG/1
50 TABLET, FILM COATED ORAL
Status: DISCONTINUED | OUTPATIENT
Start: 2023-11-08 | End: 2023-11-10 | Stop reason: HOSPADM

## 2023-11-08 RX ORDER — CARBOPROST TROMETHAMINE 250 UG/ML
250 INJECTION, SOLUTION INTRAMUSCULAR
Status: DISCONTINUED | OUTPATIENT
Start: 2023-11-08 | End: 2023-11-10 | Stop reason: HOSPADM

## 2023-11-08 RX ORDER — ASPIRIN 81 MG/1
81 TABLET, CHEWABLE ORAL DAILY
Status: ON HOLD | COMMUNITY
End: 2023-11-13

## 2023-11-08 RX ORDER — OXYTOCIN 10 [USP'U]/ML
10 INJECTION, SOLUTION INTRAMUSCULAR; INTRAVENOUS
Status: DISCONTINUED | OUTPATIENT
Start: 2023-11-08 | End: 2023-11-10 | Stop reason: HOSPADM

## 2023-11-08 RX ORDER — OXYTOCIN/0.9 % SODIUM CHLORIDE 30/500 ML
340 PLASTIC BAG, INJECTION (ML) INTRAVENOUS CONTINUOUS PRN
Status: DISCONTINUED | OUTPATIENT
Start: 2023-11-08 | End: 2023-11-10 | Stop reason: HOSPADM

## 2023-11-08 RX ORDER — METOCLOPRAMIDE 10 MG/1
10 TABLET ORAL EVERY 6 HOURS PRN
Status: DISCONTINUED | OUTPATIENT
Start: 2023-11-08 | End: 2023-11-10 | Stop reason: HOSPADM

## 2023-11-08 RX ORDER — ACETAMINOPHEN 325 MG/1
650 TABLET ORAL EVERY 4 HOURS PRN
Status: DISCONTINUED | OUTPATIENT
Start: 2023-11-08 | End: 2023-11-10 | Stop reason: HOSPADM

## 2023-11-08 RX ORDER — MISOPROSTOL 100 UG/1
25 TABLET ORAL
Status: COMPLETED | OUTPATIENT
Start: 2023-11-08 | End: 2023-11-09

## 2023-11-08 RX ORDER — KETOROLAC TROMETHAMINE 30 MG/ML
30 INJECTION, SOLUTION INTRAMUSCULAR; INTRAVENOUS
Status: DISCONTINUED | OUTPATIENT
Start: 2023-11-08 | End: 2023-11-10

## 2023-11-08 RX ORDER — IBUPROFEN 400 MG/1
800 TABLET, FILM COATED ORAL
Status: DISCONTINUED | OUTPATIENT
Start: 2023-11-08 | End: 2023-11-10

## 2023-11-08 RX ORDER — VITAMIN A ACETATE, .BETA.-CAROTENE, ASCORBIC ACID, CHOLECALCIFEROL, .ALPHA.-TOCOPHEROL ACETATE, DL-, THIAMINE MONONITRATE, RIBOFLAVIN, NIACINAMIDE, PYRIDOXINE HYDROCHLORIDE, FOLIC ACID, CYANOCOBALAMIN, CALCIUM CARBONATE, FERROUS FUMARATE, ZINC OXIDE, AND CUPRIC OXIDE 2000; 2000; 120; 400; 22; 1.84; 3; 20; 10; 1; 12; 200; 27; 25; 2 [IU]/1; [IU]/1; MG/1; [IU]/1; MG/1; MG/1; MG/1; MG/1; MG/1; MG/1; UG/1; MG/1; MG/1; MG/1; MG/1
1 TABLET ORAL DAILY
COMMUNITY

## 2023-11-08 RX ORDER — NALOXONE HYDROCHLORIDE 0.4 MG/ML
0.2 INJECTION, SOLUTION INTRAMUSCULAR; INTRAVENOUS; SUBCUTANEOUS
Status: DISCONTINUED | OUTPATIENT
Start: 2023-11-08 | End: 2023-11-10 | Stop reason: HOSPADM

## 2023-11-08 RX ORDER — MISOPROSTOL 200 UG/1
800 TABLET ORAL
Status: DISCONTINUED | OUTPATIENT
Start: 2023-11-08 | End: 2023-11-10 | Stop reason: HOSPADM

## 2023-11-08 RX ORDER — METOCLOPRAMIDE HYDROCHLORIDE 5 MG/ML
10 INJECTION INTRAMUSCULAR; INTRAVENOUS EVERY 6 HOURS PRN
Status: DISCONTINUED | OUTPATIENT
Start: 2023-11-08 | End: 2023-11-10 | Stop reason: HOSPADM

## 2023-11-08 RX ORDER — MISOPROSTOL 200 UG/1
400 TABLET ORAL
Status: DISCONTINUED | OUTPATIENT
Start: 2023-11-08 | End: 2023-11-10 | Stop reason: HOSPADM

## 2023-11-08 RX ORDER — PROCHLORPERAZINE 25 MG
25 SUPPOSITORY, RECTAL RECTAL EVERY 12 HOURS PRN
Status: DISCONTINUED | OUTPATIENT
Start: 2023-11-08 | End: 2023-11-10 | Stop reason: HOSPADM

## 2023-11-08 RX ORDER — METHYLERGONOVINE MALEATE 0.2 MG/ML
200 INJECTION INTRAVENOUS
Status: DISCONTINUED | OUTPATIENT
Start: 2023-11-08 | End: 2023-11-10 | Stop reason: HOSPADM

## 2023-11-08 RX ORDER — FENTANYL CITRATE 50 UG/ML
100 INJECTION, SOLUTION INTRAMUSCULAR; INTRAVENOUS
Status: DISCONTINUED | OUTPATIENT
Start: 2023-11-08 | End: 2023-11-10 | Stop reason: HOSPADM

## 2023-11-08 RX ORDER — TRANEXAMIC ACID 10 MG/ML
1 INJECTION, SOLUTION INTRAVENOUS EVERY 30 MIN PRN
Status: DISCONTINUED | OUTPATIENT
Start: 2023-11-08 | End: 2023-11-10 | Stop reason: HOSPADM

## 2023-11-08 RX ADMIN — MISOPROSTOL 25 MCG: 100 TABLET ORAL at 21:02

## 2023-11-08 RX ADMIN — MISOPROSTOL 25 MCG: 100 TABLET ORAL at 22:56

## 2023-11-08 ASSESSMENT — ACTIVITIES OF DAILY LIVING (ADL)
ADLS_ACUITY_SCORE: 20
ADLS_ACUITY_SCORE: 20

## 2023-11-09 ENCOUNTER — ANESTHESIA EVENT (OUTPATIENT)
Dept: OBGYN | Facility: CLINIC | Age: 43
End: 2023-11-09
Payer: COMMERCIAL

## 2023-11-09 ENCOUNTER — ANESTHESIA (OUTPATIENT)
Dept: OBGYN | Facility: CLINIC | Age: 43
End: 2023-11-09
Payer: COMMERCIAL

## 2023-11-09 LAB — T PALLIDUM AB SER QL: NONREACTIVE

## 2023-11-09 PROCEDURE — 120N000001 HC R&B MED SURG/OB

## 2023-11-09 PROCEDURE — 250N000011 HC RX IP 250 OP 636: Mod: JZ | Performed by: ANESTHESIOLOGY

## 2023-11-09 PROCEDURE — 258N000003 HC RX IP 258 OP 636: Performed by: OBSTETRICS & GYNECOLOGY

## 2023-11-09 PROCEDURE — 250N000011 HC RX IP 250 OP 636: Performed by: ANESTHESIOLOGY

## 2023-11-09 PROCEDURE — 250N000013 HC RX MED GY IP 250 OP 250 PS 637: Performed by: OBSTETRICS & GYNECOLOGY

## 2023-11-09 PROCEDURE — 250N000009 HC RX 250: Performed by: OBSTETRICS & GYNECOLOGY

## 2023-11-09 PROCEDURE — 3E033VJ INTRODUCTION OF OTHER HORMONE INTO PERIPHERAL VEIN, PERCUTANEOUS APPROACH: ICD-10-PCS | Performed by: OBSTETRICS & GYNECOLOGY

## 2023-11-09 RX ORDER — LIDOCAINE 40 MG/G
CREAM TOPICAL
Status: DISCONTINUED | OUTPATIENT
Start: 2023-11-09 | End: 2023-11-10 | Stop reason: HOSPADM

## 2023-11-09 RX ORDER — FENTANYL CITRATE-0.9 % NACL/PF 10 MCG/ML
100 PLASTIC BAG, INJECTION (ML) INTRAVENOUS EVERY 5 MIN PRN
Status: DISCONTINUED | OUTPATIENT
Start: 2023-11-09 | End: 2023-11-10 | Stop reason: HOSPADM

## 2023-11-09 RX ORDER — OXYTOCIN/0.9 % SODIUM CHLORIDE 30/500 ML
1-24 PLASTIC BAG, INJECTION (ML) INTRAVENOUS CONTINUOUS
Status: DISCONTINUED | OUTPATIENT
Start: 2023-11-09 | End: 2023-11-10 | Stop reason: HOSPADM

## 2023-11-09 RX ORDER — ONDANSETRON 2 MG/ML
4 INJECTION INTRAMUSCULAR; INTRAVENOUS EVERY 6 HOURS PRN
Status: DISCONTINUED | OUTPATIENT
Start: 2023-11-09 | End: 2023-11-10 | Stop reason: HOSPADM

## 2023-11-09 RX ORDER — NALBUPHINE HYDROCHLORIDE 20 MG/ML
2.5-5 INJECTION, SOLUTION INTRAMUSCULAR; INTRAVENOUS; SUBCUTANEOUS EVERY 6 HOURS PRN
Status: DISCONTINUED | OUTPATIENT
Start: 2023-11-09 | End: 2023-11-13 | Stop reason: HOSPADM

## 2023-11-09 RX ORDER — ONDANSETRON 4 MG/1
4 TABLET, ORALLY DISINTEGRATING ORAL EVERY 6 HOURS PRN
Status: DISCONTINUED | OUTPATIENT
Start: 2023-11-09 | End: 2023-11-10 | Stop reason: HOSPADM

## 2023-11-09 RX ORDER — ROPIVACAINE HYDROCHLORIDE 2 MG/ML
10 INJECTION, SOLUTION EPIDURAL; INFILTRATION; PERINEURAL ONCE
Status: DISCONTINUED | OUTPATIENT
Start: 2023-11-09 | End: 2023-11-10 | Stop reason: HOSPADM

## 2023-11-09 RX ORDER — SODIUM CHLORIDE, SODIUM LACTATE, POTASSIUM CHLORIDE, CALCIUM CHLORIDE 600; 310; 30; 20 MG/100ML; MG/100ML; MG/100ML; MG/100ML
INJECTION, SOLUTION INTRAVENOUS CONTINUOUS PRN
Status: DISCONTINUED | OUTPATIENT
Start: 2023-11-09 | End: 2023-11-10 | Stop reason: HOSPADM

## 2023-11-09 RX ADMIN — MISOPROSTOL 25 MCG: 100 TABLET ORAL at 16:34

## 2023-11-09 RX ADMIN — MISOPROSTOL 25 MCG: 100 TABLET ORAL at 04:59

## 2023-11-09 RX ADMIN — MISOPROSTOL 25 MCG: 100 TABLET ORAL at 14:31

## 2023-11-09 RX ADMIN — MISOPROSTOL 25 MCG: 100 TABLET ORAL at 02:59

## 2023-11-09 RX ADMIN — ROPIVACAINE HYDROCHLORIDE 10 ML: 2 INJECTION, SOLUTION EPIDURAL; INFILTRATION at 22:49

## 2023-11-09 RX ADMIN — MISOPROSTOL 25 MCG: 100 TABLET ORAL at 06:50

## 2023-11-09 RX ADMIN — MISOPROSTOL 25 MCG: 100 TABLET ORAL at 18:34

## 2023-11-09 RX ADMIN — Medication: at 22:42

## 2023-11-09 RX ADMIN — SODIUM CHLORIDE, POTASSIUM CHLORIDE, SODIUM LACTATE AND CALCIUM CHLORIDE 1000 ML: 600; 310; 30; 20 INJECTION, SOLUTION INTRAVENOUS at 21:55

## 2023-11-09 RX ADMIN — MISOPROSTOL 25 MCG: 100 TABLET ORAL at 00:58

## 2023-11-09 RX ADMIN — MISOPROSTOL 25 MCG: 100 TABLET ORAL at 12:37

## 2023-11-09 RX ADMIN — MISOPROSTOL 25 MCG: 100 TABLET ORAL at 10:34

## 2023-11-09 RX ADMIN — MISOPROSTOL 25 MCG: 100 TABLET ORAL at 20:26

## 2023-11-09 RX ADMIN — Medication 2 MILLI-UNITS/MIN: at 23:28

## 2023-11-09 ASSESSMENT — ACTIVITIES OF DAILY LIVING (ADL)
ADLS_ACUITY_SCORE: 20

## 2023-11-09 NOTE — PROGRESS NOTES
Data: Patient admitted to room 231 at 194. Patient is a . Prenatal record reviewed.   OB History    Para Term  AB Living   1 0 0 0 0 0   SAB IAB Ectopic Multiple Live Births   0 0 0 0 0      # Outcome Date GA Lbr Thom/2nd Weight Sex Delivery Anes PTL Lv   1 Current            .  Medical History:   Past Medical History:   Diagnosis Date    NO ACTIVE PROBLEMS    .  Gestational age 39w0d. Vital signs per doc flowsheet. Fetal movement present. Patient reports Induction Of Labor   as reason for admission. Support persons Carter present.  Action: Care of patient assumed at 194. Verbal consent for EFM, external fetal monitors applied. Admission assessment completed. Patient and support persons educated on labor process. Patient instructed to report change in fetal movement, contractions, vaginal leaking of fluid or bleeding, abdominal pain, or any concerns related to the pregnancy to her nurse/physician. Patient oriented to room, call light in reach.   Response: Dr. Jaymie Quispe informed of patients a. Plan per provider is give PO cytotec for cervical ripening and update throughout the shift. Patient verbalized understanding of education and verbalized agreement with plan. Patient coping with labor.

## 2023-11-09 NOTE — PROVIDER NOTIFICATION
11/09/23 0930   Comments   Comments glory 5x in 10 minutes, encouragegd patient to eat and drink some fluids. Oral dose of cytotec held till contrations spaced alittle.

## 2023-11-09 NOTE — PROGRESS NOTES
Patient denies adhesive allergy. Specifically discussed no previous reaction to band-aids, other adhesives or other medical patches. Patient educated that redness may occur following removal of the patches and will slowly fade. Patient instructed to notify RN if any adverse reaction noted. Susan monitor applied and in use.

## 2023-11-09 NOTE — PROVIDER NOTIFICATION
11/09/23 0507   Provider Notification   Provider Name/Title Dr. Jaymie Quispe   Method of Notification Electronic Page   Request Evaluate - Remote   Notification Reason SVE;Status Update     231 KG SVE 0/70/-2 Andres 5, CAT 1 tracing, CTX 2-3, comfortable, 5 doses oral cyctotec given.

## 2023-11-09 NOTE — PROGRESS NOTES
Labor Note:    S: Pt is mildly uncomfortable. On Oral Cytotec.     O:  /74 (BP Location: Left arm)   Temp 98  F (36.7  C) (Oral)   Resp 18   SVE: closed/80/-3/mid/soft  TOCO: Q2-3 minutes  FHR: Cat 1    A/P: 44 yo  at 39+1/7 wks. Induction of labor for IVF and AMA.   Anticipate . Pitocin or AROM or balloon when able.   GBS negative, RH negative.   Pain medication as desired.     Jaymie Quispe MD

## 2023-11-09 NOTE — PLAN OF CARE
Goal Outcome Evaluation:      Plan of Care Reviewed With: patient, spouse    Overall Patient Progress: improvingOverall Patient Progress: improving     VSS, Maternal assessment WNL, SVE 0/70/-2, CAT1 Tracing, Updated Dr. Jaymie Quispe, patient is comfortable, spouse at bedside for support.

## 2023-11-10 LAB
ABO/RH(D): NORMAL
FETAL BLEED SCREEN: NORMAL
SPECIMEN EXPIRATION DATE: NORMAL

## 2023-11-10 PROCEDURE — 250N000011 HC RX IP 250 OP 636: Performed by: STUDENT IN AN ORGANIZED HEALTH CARE EDUCATION/TRAINING PROGRAM

## 2023-11-10 PROCEDURE — 250N000011 HC RX IP 250 OP 636: Mod: JZ | Performed by: OBSTETRICS & GYNECOLOGY

## 2023-11-10 PROCEDURE — 360N000076 HC SURGERY LEVEL 3, PER MIN: Performed by: STUDENT IN AN ORGANIZED HEALTH CARE EDUCATION/TRAINING PROGRAM

## 2023-11-10 PROCEDURE — 250N000013 HC RX MED GY IP 250 OP 250 PS 637: Performed by: STUDENT IN AN ORGANIZED HEALTH CARE EDUCATION/TRAINING PROGRAM

## 2023-11-10 PROCEDURE — 120N000012 HC R&B POSTPARTUM

## 2023-11-10 PROCEDURE — 258N000003 HC RX IP 258 OP 636: Performed by: OBSTETRICS & GYNECOLOGY

## 2023-11-10 PROCEDURE — 3E0R3BZ INTRODUCTION OF ANESTHETIC AGENT INTO SPINAL CANAL, PERCUTANEOUS APPROACH: ICD-10-PCS | Performed by: ANESTHESIOLOGY

## 2023-11-10 PROCEDURE — 250N000011 HC RX IP 250 OP 636: Performed by: OBSTETRICS & GYNECOLOGY

## 2023-11-10 PROCEDURE — 250N000011 HC RX IP 250 OP 636: Performed by: ANESTHESIOLOGY

## 2023-11-10 PROCEDURE — 710N000009 HC RECOVERY PHASE 1, LEVEL 1, PER MIN: Performed by: STUDENT IN AN ORGANIZED HEALTH CARE EDUCATION/TRAINING PROGRAM

## 2023-11-10 PROCEDURE — 258N000003 HC RX IP 258 OP 636: Performed by: NURSE ANESTHETIST, CERTIFIED REGISTERED

## 2023-11-10 PROCEDURE — 250N000011 HC RX IP 250 OP 636: Performed by: NURSE ANESTHETIST, CERTIFIED REGISTERED

## 2023-11-10 PROCEDURE — 272N000001 HC OR GENERAL SUPPLY STERILE: Performed by: STUDENT IN AN ORGANIZED HEALTH CARE EDUCATION/TRAINING PROGRAM

## 2023-11-10 PROCEDURE — 250N000009 HC RX 250: Performed by: NURSE ANESTHETIST, CERTIFIED REGISTERED

## 2023-11-10 PROCEDURE — 370N000017 HC ANESTHESIA TECHNICAL FEE, PER MIN: Performed by: STUDENT IN AN ORGANIZED HEALTH CARE EDUCATION/TRAINING PROGRAM

## 2023-11-10 PROCEDURE — 00HU33Z INSERTION OF INFUSION DEVICE INTO SPINAL CANAL, PERCUTANEOUS APPROACH: ICD-10-PCS | Performed by: ANESTHESIOLOGY

## 2023-11-10 RX ORDER — CARBOPROST TROMETHAMINE 250 UG/ML
250 INJECTION, SOLUTION INTRAMUSCULAR
Status: DISCONTINUED | OUTPATIENT
Start: 2023-11-10 | End: 2023-11-10 | Stop reason: HOSPADM

## 2023-11-10 RX ORDER — LIDOCAINE 40 MG/G
CREAM TOPICAL
Status: DISCONTINUED | OUTPATIENT
Start: 2023-11-10 | End: 2023-11-10 | Stop reason: HOSPADM

## 2023-11-10 RX ORDER — NALOXONE HYDROCHLORIDE 0.4 MG/ML
0.2 INJECTION, SOLUTION INTRAMUSCULAR; INTRAVENOUS; SUBCUTANEOUS
Status: DISCONTINUED | OUTPATIENT
Start: 2023-11-10 | End: 2023-11-13 | Stop reason: HOSPADM

## 2023-11-10 RX ORDER — METOCLOPRAMIDE HYDROCHLORIDE 5 MG/ML
10 INJECTION INTRAMUSCULAR; INTRAVENOUS EVERY 6 HOURS PRN
Status: DISCONTINUED | OUTPATIENT
Start: 2023-11-10 | End: 2023-11-13 | Stop reason: HOSPADM

## 2023-11-10 RX ORDER — NALOXONE HYDROCHLORIDE 0.4 MG/ML
0.4 INJECTION, SOLUTION INTRAMUSCULAR; INTRAVENOUS; SUBCUTANEOUS
Status: DISCONTINUED | OUTPATIENT
Start: 2023-11-10 | End: 2023-11-13 | Stop reason: HOSPADM

## 2023-11-10 RX ORDER — LIDOCAINE HCL/EPINEPHRINE/PF 2%-1:200K
VIAL (ML) INJECTION PRN
Status: DISCONTINUED | OUTPATIENT
Start: 2023-11-10 | End: 2023-11-10

## 2023-11-10 RX ORDER — CEFAZOLIN SODIUM 2 G/100ML
2 INJECTION, SOLUTION INTRAVENOUS
Status: COMPLETED | OUTPATIENT
Start: 2023-11-10 | End: 2023-11-10

## 2023-11-10 RX ORDER — AZITHROMYCIN 500 MG/1
500 INJECTION, POWDER, LYOPHILIZED, FOR SOLUTION INTRAVENOUS
Status: COMPLETED | OUTPATIENT
Start: 2023-11-10 | End: 2023-11-10

## 2023-11-10 RX ORDER — AMOXICILLIN 250 MG
2 CAPSULE ORAL 2 TIMES DAILY
Status: DISCONTINUED | OUTPATIENT
Start: 2023-11-10 | End: 2023-11-13 | Stop reason: HOSPADM

## 2023-11-10 RX ORDER — TRANEXAMIC ACID 10 MG/ML
1 INJECTION, SOLUTION INTRAVENOUS EVERY 30 MIN PRN
Status: DISCONTINUED | OUTPATIENT
Start: 2023-11-10 | End: 2023-11-10 | Stop reason: HOSPADM

## 2023-11-10 RX ORDER — NALBUPHINE HYDROCHLORIDE 20 MG/ML
2.5-5 INJECTION, SOLUTION INTRAMUSCULAR; INTRAVENOUS; SUBCUTANEOUS EVERY 6 HOURS PRN
Status: DISCONTINUED | OUTPATIENT
Start: 2023-11-10 | End: 2023-11-13 | Stop reason: HOSPADM

## 2023-11-10 RX ORDER — PROCHLORPERAZINE MALEATE 10 MG
10 TABLET ORAL EVERY 6 HOURS PRN
Status: DISCONTINUED | OUTPATIENT
Start: 2023-11-10 | End: 2023-11-13 | Stop reason: HOSPADM

## 2023-11-10 RX ORDER — KETOROLAC TROMETHAMINE 30 MG/ML
30 INJECTION, SOLUTION INTRAMUSCULAR; INTRAVENOUS EVERY 6 HOURS
Status: COMPLETED | OUTPATIENT
Start: 2023-11-11 | End: 2023-11-11

## 2023-11-10 RX ORDER — MISOPROSTOL 200 UG/1
800 TABLET ORAL
Status: DISCONTINUED | OUTPATIENT
Start: 2023-11-10 | End: 2023-11-10 | Stop reason: HOSPADM

## 2023-11-10 RX ORDER — PROCHLORPERAZINE 25 MG
25 SUPPOSITORY, RECTAL RECTAL EVERY 12 HOURS PRN
Status: DISCONTINUED | OUTPATIENT
Start: 2023-11-10 | End: 2023-11-13 | Stop reason: HOSPADM

## 2023-11-10 RX ORDER — OXYTOCIN/0.9 % SODIUM CHLORIDE 30/500 ML
100-340 PLASTIC BAG, INJECTION (ML) INTRAVENOUS CONTINUOUS PRN
Status: DISCONTINUED | OUTPATIENT
Start: 2023-11-10 | End: 2023-11-10

## 2023-11-10 RX ORDER — ONDANSETRON 4 MG/1
4 TABLET, ORALLY DISINTEGRATING ORAL EVERY 6 HOURS PRN
Status: DISCONTINUED | OUTPATIENT
Start: 2023-11-10 | End: 2023-11-13 | Stop reason: HOSPADM

## 2023-11-10 RX ORDER — OXYTOCIN 10 [USP'U]/ML
10 INJECTION, SOLUTION INTRAMUSCULAR; INTRAVENOUS
Status: DISCONTINUED | OUTPATIENT
Start: 2023-11-10 | End: 2023-11-10

## 2023-11-10 RX ORDER — IBUPROFEN 400 MG/1
800 TABLET, FILM COATED ORAL EVERY 6 HOURS
Status: DISCONTINUED | OUTPATIENT
Start: 2023-11-11 | End: 2023-11-13 | Stop reason: HOSPADM

## 2023-11-10 RX ORDER — CEFAZOLIN SODIUM 2 G/100ML
2 INJECTION, SOLUTION INTRAVENOUS SEE ADMIN INSTRUCTIONS
Status: DISCONTINUED | OUTPATIENT
Start: 2023-11-10 | End: 2023-11-10 | Stop reason: HOSPADM

## 2023-11-10 RX ORDER — FENTANYL CITRATE-0.9 % NACL/PF 10 MCG/ML
100 PLASTIC BAG, INJECTION (ML) INTRAVENOUS EVERY 5 MIN PRN
Status: DISCONTINUED | OUTPATIENT
Start: 2023-11-10 | End: 2023-11-10 | Stop reason: HOSPADM

## 2023-11-10 RX ORDER — SODIUM CHLORIDE, SODIUM LACTATE, POTASSIUM CHLORIDE, CALCIUM CHLORIDE 600; 310; 30; 20 MG/100ML; MG/100ML; MG/100ML; MG/100ML
INJECTION, SOLUTION INTRAVENOUS CONTINUOUS PRN
Status: DISCONTINUED | OUTPATIENT
Start: 2023-11-10 | End: 2023-11-10

## 2023-11-10 RX ORDER — ONDANSETRON 2 MG/ML
4 INJECTION INTRAMUSCULAR; INTRAVENOUS EVERY 6 HOURS PRN
Status: DISCONTINUED | OUTPATIENT
Start: 2023-11-10 | End: 2023-11-13 | Stop reason: HOSPADM

## 2023-11-10 RX ORDER — ROPIVACAINE HYDROCHLORIDE 2 MG/ML
INJECTION, SOLUTION EPIDURAL; INFILTRATION; PERINEURAL
Status: COMPLETED | OUTPATIENT
Start: 2023-11-09 | End: 2023-11-09

## 2023-11-10 RX ORDER — OXYTOCIN/0.9 % SODIUM CHLORIDE 30/500 ML
PLASTIC BAG, INJECTION (ML) INTRAVENOUS CONTINUOUS PRN
Status: DISCONTINUED | OUTPATIENT
Start: 2023-11-10 | End: 2023-11-10

## 2023-11-10 RX ORDER — LIDOCAINE 40 MG/G
CREAM TOPICAL
Status: DISCONTINUED | OUTPATIENT
Start: 2023-11-10 | End: 2023-11-13 | Stop reason: HOSPADM

## 2023-11-10 RX ORDER — ONDANSETRON 2 MG/ML
INJECTION INTRAMUSCULAR; INTRAVENOUS PRN
Status: DISCONTINUED | OUTPATIENT
Start: 2023-11-10 | End: 2023-11-10

## 2023-11-10 RX ORDER — AZITHROMYCIN 500 MG/1
INJECTION, POWDER, LYOPHILIZED, FOR SOLUTION INTRAVENOUS
Status: COMPLETED
Start: 2023-11-10 | End: 2023-11-10

## 2023-11-10 RX ORDER — METHYLERGONOVINE MALEATE 0.2 MG/ML
200 INJECTION INTRAVENOUS
Status: DISCONTINUED | OUTPATIENT
Start: 2023-11-10 | End: 2023-11-10 | Stop reason: HOSPADM

## 2023-11-10 RX ORDER — ROPIVACAINE HYDROCHLORIDE 2 MG/ML
10 INJECTION, SOLUTION EPIDURAL; INFILTRATION; PERINEURAL ONCE
Status: DISCONTINUED | OUTPATIENT
Start: 2023-11-10 | End: 2023-11-10 | Stop reason: HOSPADM

## 2023-11-10 RX ORDER — ACETAMINOPHEN 325 MG/1
975 TABLET ORAL EVERY 6 HOURS
Status: DISCONTINUED | OUTPATIENT
Start: 2023-11-11 | End: 2023-11-13 | Stop reason: HOSPADM

## 2023-11-10 RX ORDER — BISACODYL 10 MG
10 SUPPOSITORY, RECTAL RECTAL DAILY PRN
Status: DISCONTINUED | OUTPATIENT
Start: 2023-11-12 | End: 2023-11-13 | Stop reason: HOSPADM

## 2023-11-10 RX ORDER — ROPIVACAINE HYDROCHLORIDE 2 MG/ML
INJECTION, SOLUTION EPIDURAL; INFILTRATION; PERINEURAL
Status: COMPLETED | OUTPATIENT
Start: 2023-11-10 | End: 2023-11-10

## 2023-11-10 RX ORDER — CITRIC ACID/SODIUM CITRATE 334-500MG
30 SOLUTION, ORAL ORAL
Status: COMPLETED | OUTPATIENT
Start: 2023-11-10 | End: 2023-11-10

## 2023-11-10 RX ORDER — OXYTOCIN/0.9 % SODIUM CHLORIDE 30/500 ML
340 PLASTIC BAG, INJECTION (ML) INTRAVENOUS CONTINUOUS PRN
Status: DISCONTINUED | OUTPATIENT
Start: 2023-11-10 | End: 2023-11-10 | Stop reason: HOSPADM

## 2023-11-10 RX ORDER — KETOROLAC TROMETHAMINE 30 MG/ML
INJECTION, SOLUTION INTRAMUSCULAR; INTRAVENOUS PRN
Status: DISCONTINUED | OUTPATIENT
Start: 2023-11-10 | End: 2023-11-10

## 2023-11-10 RX ORDER — FENTANYL CITRATE 50 UG/ML
INJECTION, SOLUTION INTRAMUSCULAR; INTRAVENOUS PRN
Status: DISCONTINUED | OUTPATIENT
Start: 2023-11-10 | End: 2023-11-10

## 2023-11-10 RX ORDER — DEXTROSE, SODIUM CHLORIDE, SODIUM LACTATE, POTASSIUM CHLORIDE, AND CALCIUM CHLORIDE 5; .6; .31; .03; .02 G/100ML; G/100ML; G/100ML; G/100ML; G/100ML
INJECTION, SOLUTION INTRAVENOUS CONTINUOUS
Status: DISCONTINUED | OUTPATIENT
Start: 2023-11-10 | End: 2023-11-11

## 2023-11-10 RX ORDER — DEXTROSE, SODIUM CHLORIDE, SODIUM LACTATE, POTASSIUM CHLORIDE, AND CALCIUM CHLORIDE 5; .6; .31; .03; .02 G/100ML; G/100ML; G/100ML; G/100ML; G/100ML
INJECTION, SOLUTION INTRAVENOUS CONTINUOUS
Status: DISCONTINUED | OUTPATIENT
Start: 2023-11-10 | End: 2023-11-13 | Stop reason: HOSPADM

## 2023-11-10 RX ORDER — METOCLOPRAMIDE 10 MG/1
10 TABLET ORAL EVERY 6 HOURS PRN
Status: DISCONTINUED | OUTPATIENT
Start: 2023-11-10 | End: 2023-11-13 | Stop reason: HOSPADM

## 2023-11-10 RX ORDER — SODIUM CHLORIDE, SODIUM LACTATE, POTASSIUM CHLORIDE, CALCIUM CHLORIDE 600; 310; 30; 20 MG/100ML; MG/100ML; MG/100ML; MG/100ML
INJECTION, SOLUTION INTRAVENOUS CONTINUOUS
Status: DISCONTINUED | OUTPATIENT
Start: 2023-11-10 | End: 2023-11-10 | Stop reason: HOSPADM

## 2023-11-10 RX ORDER — OXYTOCIN 10 [USP'U]/ML
10 INJECTION, SOLUTION INTRAMUSCULAR; INTRAVENOUS
Status: DISCONTINUED | OUTPATIENT
Start: 2023-11-10 | End: 2023-11-10 | Stop reason: HOSPADM

## 2023-11-10 RX ORDER — AMOXICILLIN 250 MG
1 CAPSULE ORAL 2 TIMES DAILY
Status: DISCONTINUED | OUTPATIENT
Start: 2023-11-10 | End: 2023-11-13 | Stop reason: HOSPADM

## 2023-11-10 RX ORDER — SIMETHICONE 80 MG
80 TABLET,CHEWABLE ORAL 4 TIMES DAILY PRN
Status: DISCONTINUED | OUTPATIENT
Start: 2023-11-10 | End: 2023-11-13 | Stop reason: HOSPADM

## 2023-11-10 RX ORDER — HYDROCORTISONE 25 MG/G
CREAM TOPICAL 3 TIMES DAILY PRN
Status: DISCONTINUED | OUTPATIENT
Start: 2023-11-10 | End: 2023-11-13 | Stop reason: HOSPADM

## 2023-11-10 RX ORDER — MORPHINE SULFATE 1 MG/ML
INJECTION, SOLUTION EPIDURAL; INTRATHECAL; INTRAVENOUS PRN
Status: DISCONTINUED | OUTPATIENT
Start: 2023-11-10 | End: 2023-11-10

## 2023-11-10 RX ORDER — ACETAMINOPHEN 325 MG/1
975 TABLET ORAL ONCE
Status: COMPLETED | OUTPATIENT
Start: 2023-11-10 | End: 2023-11-10

## 2023-11-10 RX ORDER — MODIFIED LANOLIN
OINTMENT (GRAM) TOPICAL
Status: DISCONTINUED | OUTPATIENT
Start: 2023-11-10 | End: 2023-11-13 | Stop reason: HOSPADM

## 2023-11-10 RX ORDER — OXYCODONE HYDROCHLORIDE 5 MG/1
5 TABLET ORAL EVERY 4 HOURS PRN
Status: DISCONTINUED | OUTPATIENT
Start: 2023-11-10 | End: 2023-11-13 | Stop reason: HOSPADM

## 2023-11-10 RX ORDER — MISOPROSTOL 200 UG/1
400 TABLET ORAL
Status: DISCONTINUED | OUTPATIENT
Start: 2023-11-10 | End: 2023-11-10 | Stop reason: HOSPADM

## 2023-11-10 RX ADMIN — AZITHROMYCIN MONOHYDRATE 500 MG: 500 INJECTION, POWDER, LYOPHILIZED, FOR SOLUTION INTRAVENOUS at 18:52

## 2023-11-10 RX ADMIN — Medication: at 06:50

## 2023-11-10 RX ADMIN — KETOROLAC TROMETHAMINE 30 MG: 30 INJECTION, SOLUTION INTRAMUSCULAR at 19:22

## 2023-11-10 RX ADMIN — ROPIVACAINE HYDROCHLORIDE 5 ML: 2 INJECTION, SOLUTION EPIDURAL; INFILTRATION; PERINEURAL at 12:01

## 2023-11-10 RX ADMIN — LIDOCAINE HYDROCHLORIDE,EPINEPHRINE BITARTRATE 5 ML: 20; .005 INJECTION, SOLUTION EPIDURAL; INFILTRATION; INTRACAUDAL; PERINEURAL at 18:42

## 2023-11-10 RX ADMIN — ONDANSETRON 4 MG: 2 INJECTION INTRAMUSCULAR; INTRAVENOUS at 19:08

## 2023-11-10 RX ADMIN — SODIUM CITRATE AND CITRIC ACID MONOHYDRATE 30 ML: 500; 334 SOLUTION ORAL at 18:33

## 2023-11-10 RX ADMIN — ACETAMINOPHEN 975 MG: 325 TABLET, FILM COATED ORAL at 18:33

## 2023-11-10 RX ADMIN — Medication 340 ML/HR: at 19:01

## 2023-11-10 RX ADMIN — MORPHINE SULFATE 3.5 MG: 1 INJECTION, SOLUTION EPIDURAL; INTRATHECAL; INTRAVENOUS at 19:03

## 2023-11-10 RX ADMIN — SODIUM CHLORIDE, POTASSIUM CHLORIDE, SODIUM LACTATE AND CALCIUM CHLORIDE: 600; 310; 30; 20 INJECTION, SOLUTION INTRAVENOUS at 07:37

## 2023-11-10 RX ADMIN — SODIUM CHLORIDE, POTASSIUM CHLORIDE, SODIUM LACTATE AND CALCIUM CHLORIDE: 600; 310; 30; 20 INJECTION, SOLUTION INTRAVENOUS at 18:39

## 2023-11-10 RX ADMIN — PHENYLEPHRINE HYDROCHLORIDE 0.5 MCG/KG/MIN: 10 INJECTION INTRAVENOUS at 18:39

## 2023-11-10 RX ADMIN — LIDOCAINE HYDROCHLORIDE,EPINEPHRINE BITARTRATE 15 ML: 20; .005 INJECTION, SOLUTION EPIDURAL; INFILTRATION; INTRACAUDAL; PERINEURAL at 18:39

## 2023-11-10 RX ADMIN — CEFAZOLIN SODIUM 2 G: 2 INJECTION, SOLUTION INTRAVENOUS at 18:39

## 2023-11-10 RX ADMIN — METOCLOPRAMIDE 10 MG: 5 INJECTION, SOLUTION INTRAMUSCULAR; INTRAVENOUS at 11:23

## 2023-11-10 RX ADMIN — SODIUM CHLORIDE, POTASSIUM CHLORIDE, SODIUM LACTATE AND CALCIUM CHLORIDE: 600; 310; 30; 20 INJECTION, SOLUTION INTRAVENOUS at 10:25

## 2023-11-10 RX ADMIN — FENTANYL CITRATE 100 MCG: 50 INJECTION INTRAMUSCULAR; INTRAVENOUS at 18:49

## 2023-11-10 RX ADMIN — Medication: at 12:02

## 2023-11-10 RX ADMIN — SODIUM CHLORIDE, SODIUM LACTATE, POTASSIUM CHLORIDE, CALCIUM CHLORIDE AND DEXTROSE MONOHYDRATE: 5; 600; 310; 30; 20 INJECTION, SOLUTION INTRAVENOUS at 14:41

## 2023-11-10 RX ADMIN — METOCLOPRAMIDE 10 MG: 5 INJECTION, SOLUTION INTRAMUSCULAR; INTRAVENOUS at 03:54

## 2023-11-10 RX ADMIN — SODIUM CHLORIDE, SODIUM LACTATE, POTASSIUM CHLORIDE, CALCIUM CHLORIDE AND DEXTROSE MONOHYDRATE 1000 ML: 5; 600; 310; 30; 20 INJECTION, SOLUTION INTRAVENOUS at 22:33

## 2023-11-10 ASSESSMENT — ACTIVITIES OF DAILY LIVING (ADL)
ADLS_ACUITY_SCORE: 20

## 2023-11-10 NOTE — PROGRESS NOTES
Comfortable with epidural  /58   Pulse 91   Temp 99.5  F (37.5  C) (Oral)   Resp 16   SpO2 96%    [place]: q4-5  FHT 140s, mod variability, variable decels to 110 with ctx  SVE 7 per RN    A/P: 43 year old  @ 39w2d for IOL due to AMA, IVF  Continue pitocin      Briana Reaglado MD

## 2023-11-10 NOTE — PLAN OF CARE
Goal Outcome Evaluation:             11th dose of po cytotec given at 1830, after 12th dose Dr Le to determine next plan of care. End of  shift to Latricia HUI.

## 2023-11-10 NOTE — PROGRESS NOTES
BARBARA Maldonado arrived to bedside for epidural placement. Pt sitting at edge of bed. Consent signed and questions answered. Timeout done. Epidural placed without complications. Pt assisted to R tilt and blood pressure's cycled per orders. Pt felt good pain relief within 15 min of pump starting.

## 2023-11-10 NOTE — ANESTHESIA PROCEDURE NOTES
"Epidural catheter Procedure Note    Pre-Procedure   Staff -        Anesthesiologist:  Jacinda Green MD       Performed By: anesthesiologist       Location: OB       Pre-Anesthestic Checklist: patient identified, IV checked, risks and benefits discussed, informed consent, monitors and equipment checked, pre-op evaluation, at physician/surgeon's request and post-op pain management  Timeout:       Correct Patient: Yes        Correct Procedure: Yes        Correct Site: Yes        Correct Position: Yes   Procedure Documentation  Procedure: epidural catheter       Diagnosis: Labor       Patient Position: sitting       Patient Prep/Sterile Barriers: sterile gloves, mask, patient draped       Skin prep: Chloraprep       Local skin infiltrated with 3 mL of 1% lidocaine.        Insertion Site: L2-3. (midline approach).       Technique: LORT saline and LORT air        CHARI at 5 cm.       Needle Type: SpumeNewsy needle       Needle Gauge: 17.        Needle Length (Inches): 3.5           Catheter threaded easily.           Threaded 12 cm at skin.         # of attempts: 1 and  # of redirects:  1    Assessment/Narrative         Paresthesias: No.       Test dose of 5 mL lidocaine 1.5% w/ 1:200,000 epinephrine at.         Test dose negative, 3 minutes after injection, for signs of intravascular, subdural, or intrathecal injection.       Insertion/Infusion Method: LORT saline and LORT air       Aspiration negative for Heme or CSF via Epidural Catheter.    Medication(s) Administered   0.2% Ropivacaine (Epidural) - EPIDURAL   5 mL - 11/10/2023 12:01:00 PM    FOR [facility] ([facility] ONLY:   Pain Team Contact information: please page the Pain Team Via Leadspace. Search \"Pain\". During daytime hours, please page the attending first. At night please page the resident first.      "

## 2023-11-10 NOTE — PROGRESS NOTES
Pt comfortable after replacement of epidural.  Dr Regalado on unit, and updated on SVE, epidural replacement, pitocin @12.  She viewed tracing and said cont with increasing pitocin.

## 2023-11-10 NOTE — PROVIDER NOTIFICATION
11/09/23 2123   Provider Notification   Provider Name/Title Dr. Juancarlos Le   Method of Notification Electronic Page   Request Evaluate - Remote   Notification Reason SVE;Status Update;Membrane Status     231 KG SVE 0.5/70/-3, SROM 2115 Clear, 12th dose of cytotec given @ 2030

## 2023-11-10 NOTE — PROGRESS NOTES
Electronic text page sent to BATSHEVA Levy 7/100/-1 and [facility].

## 2023-11-10 NOTE — PROGRESS NOTES
Dr Briana Regalado still on unit, req that she review FHR tracing to eval decels.  Pt just repositioned, LR fluid bolus is infusing.  Plan: RN to place IUPC and FSE.

## 2023-11-10 NOTE — ANESTHESIA PROCEDURE NOTES
"Epidural catheter Procedure Note    Pre-Procedure   Staff -        Anesthesiologist:  Frida Paige MD       Performed By: anesthesiologist       Location: OB       Pre-Anesthestic Checklist: patient identified, IV checked, risks and benefits discussed, informed consent, monitors and equipment checked, pre-op evaluation and at physician/surgeon's request  Timeout:       Correct Patient: Yes        Correct Procedure: Yes        Correct Site: Yes        Correct Position: Yes   Procedure Documentation  Procedure: epidural catheter       Patient Position: sitting       Patient Prep/Sterile Barriers: sterile gloves, mask, patient draped       Skin prep: Betadine       Local skin infiltrated with 3 mL of 1% lidocaine.        Insertion Site: L3-4. (midline approach).       Technique: LORT saline        CHARI at 5 cm.       Needle Type: [employer] needle       Needle Gauge: 17.        Needle Length (Inches): 3.5        Catheter: 19 G.          Catheter threaded easily.         4 cm epidural space.         Threaded 9 cm at skin.         # of attempts: 1 and  # of redirects:          : 0.    Assessment/Narrative         Paresthesias: No.       Test dose of 3 mL lidocaine 1.5% w/ 1:200,000 epinephrine at.         Test dose negative, 3 minutes after injection, for signs of intravascular, subdural, or intrathecal injection.       Insertion/Infusion Method: LORT saline       Aspiration negative for Heme or CSF via Epidural Catheter.    Medication(s) Administered   0.2% Ropivacaine (Epidural) - EPIDURAL   10 mL - 11/9/2023 10:49:00 PM   Comments:  Pt tolerated well.   Immediately to supine with BETTY.   FHTs stable post-procedure.   No complications.       FOR [facility] ([facility]) ONLY:   Pain Team Contact information: please page the Pain Team Via [employer]. Search \"Pain\". During daytime hours, please page the attending first. At night please page the resident first.      "

## 2023-11-10 NOTE — PROGRESS NOTES
Comfortable with replaced epidural.  /76   Pulse 91   Temp 98.5  F (36.9  C) (Oral)   Resp 20   SpO2 98%    [place]: q5-6  FHT 150s, mod variability, no decels  SVE 7 per RN    A/P: 43 year old  @ 39w2d for IOL  Inadequate contraction pattern  Continue to increase pitocin  FHT Category 1    Briana Regalado MD

## 2023-11-10 NOTE — PROGRESS NOTES
Epidural catheter tubing disconnected, epidural removed, notified anesthesia.  Dr OSMANI Green placed a new epidural.

## 2023-11-10 NOTE — PLAN OF CARE
Goal Outcome Evaluation:           Overall Patient Progress: improvingOverall Patient Progress: improving     VSS, Maternal assessment WNL, SVE @ 0330 4.5/80/-1, CAT 1 Tracing, epidural in place for pain management, spouse at bedside for support.

## 2023-11-10 NOTE — PROGRESS NOTES
Pt tolerated about 30 min of hands and knees with postion change.  RN at bedside the entire time.  Now on left side running man with peanut ball.

## 2023-11-10 NOTE — ANESTHESIA PREPROCEDURE EVALUATION
Anesthesia Pre-Procedure Evaluation    Patient: Anna Smith   MRN: [id number] : 1980        Procedure :           Past Medical History:   Diagnosis Date    NO ACTIVE PROBLEMS       Past Surgical History:   Procedure Laterality Date    NO HISTORY OF SURGERY        Allergies   Allergen Reactions    Erythromycin     Metronidazole      Oral = Rash and burning, topical = fine       Social History     Tobacco Use    Smoking status: Never    Smokeless tobacco: Never   Substance Use Topics    Alcohol use: Not Currently     Alcohol/week: 3.3 standard drinks of alcohol     Comment: 2 per week avg      Wt Readings from Last 1 Encounters:   18 45.5 kg (100 lb 3.2 oz)        Anesthesia Evaluation            ROS/MED HX  ENT/Pulmonary:    (-) asthma   Neurologic:  - neg neurologic ROS     Cardiovascular:    (-) PIH   METS/Exercise Tolerance:     Hematologic:     (+)  no anticoagulation therapy, no coagulopathy,             Musculoskeletal:       GI/Hepatic:     (+) GERD,                   Renal/Genitourinary:       Endo:       Psychiatric/Substance Use:       Infectious Disease:       Malignancy:       Other:            Physical Exam    Airway        Mallampati: II   TM distance: > 3 FB   Neck ROM: full     Respiratory Devices and Support         Dental  no notable dental history         Cardiovascular   cardiovascular exam normal          Pulmonary   pulmonary exam normal                OUTSIDE LABS:  CBC:   Lab Results   Component Value Date    WBC 4.3 2018    HGB 12.0 2023    HGB 15.1 2018    HCT 45.1 2018     2018     BMP:   Lab Results   Component Value Date     2018     2007    POTASSIUM 4.2 2018    POTASSIUM 4.0 2007    CHLORIDE 109 2018    CHLORIDE 102 2007    CO2 21 2018    CO2 23 2007    BUN 10 2018    BUN 13 2007    CR 0.82 2018    CR 0.80 2007    GLC 75 2018    GLC 84  "01/25/2007     COAGS: No results found for: \"PTT\", \"INR\", \"FIBR\"  POC: No results found for: \"BGM\", \"HCG\", \"HCGS\"  HEPATIC:   Lab Results   Component Value Date    ALBUMIN 4.5 11/07/2018    PROTTOTAL 7.8 11/07/2018    ALT 16 11/07/2018    AST 16 11/07/2018    ALKPHOS 38 (L) 11/07/2018    BILITOTAL 0.7 11/07/2018     OTHER:   Lab Results   Component Value Date    OLMAN 9.2 11/07/2018    TSH 1.53 11/07/2018       Anesthesia Plan    ASA Status:  2       Anesthesia Type: Epidural.              Consents    Anesthesia Plan(s) and associated risks, benefits, and realistic alternatives discussed. Questions answered and patient/representative(s) expressed understanding.     - Discussed:     - Discussed with:  Patient            Postoperative Care            Comments:    Other Comments: Orders to manage the epidural infusion have been entered, and through coordination with the nurse, we will continute to manage and monitor the patient's labor epidural.  We will continuously be available to adjust as needed thruout the entire L&D process.       Addendum:  Csection called for fetal intolerance, within 30min.  Pt states epidural is now working well.  No problems with anesthesia or health problems.    Abiodun Paige MD, MD  "

## 2023-11-11 ENCOUNTER — ANESTHESIA (OUTPATIENT)
Dept: OBGYN | Facility: CLINIC | Age: 43
End: 2023-11-11
Payer: COMMERCIAL

## 2023-11-11 ENCOUNTER — ANESTHESIA EVENT (OUTPATIENT)
Dept: OBGYN | Facility: CLINIC | Age: 43
End: 2023-11-11
Payer: COMMERCIAL

## 2023-11-11 LAB — HGB BLD-MCNC: 11.7 G/DL (ref 11.7–15.7)

## 2023-11-11 PROCEDURE — 86901 BLOOD TYPING SEROLOGIC RH(D): CPT | Performed by: STUDENT IN AN ORGANIZED HEALTH CARE EDUCATION/TRAINING PROGRAM

## 2023-11-11 PROCEDURE — 250N000011 HC RX IP 250 OP 636: Mod: JZ | Performed by: STUDENT IN AN ORGANIZED HEALTH CARE EDUCATION/TRAINING PROGRAM

## 2023-11-11 PROCEDURE — 120N000012 HC R&B POSTPARTUM

## 2023-11-11 PROCEDURE — 250N000013 HC RX MED GY IP 250 OP 250 PS 637: Performed by: STUDENT IN AN ORGANIZED HEALTH CARE EDUCATION/TRAINING PROGRAM

## 2023-11-11 PROCEDURE — 85018 HEMOGLOBIN: CPT | Performed by: STUDENT IN AN ORGANIZED HEALTH CARE EDUCATION/TRAINING PROGRAM

## 2023-11-11 PROCEDURE — 36415 COLL VENOUS BLD VENIPUNCTURE: CPT | Performed by: STUDENT IN AN ORGANIZED HEALTH CARE EDUCATION/TRAINING PROGRAM

## 2023-11-11 RX ADMIN — ACETAMINOPHEN 975 MG: 325 TABLET, FILM COATED ORAL at 14:06

## 2023-11-11 RX ADMIN — KETOROLAC TROMETHAMINE 30 MG: 30 INJECTION, SOLUTION INTRAMUSCULAR; INTRAVENOUS at 09:09

## 2023-11-11 RX ADMIN — ACETAMINOPHEN 975 MG: 325 TABLET, FILM COATED ORAL at 08:17

## 2023-11-11 RX ADMIN — KETOROLAC TROMETHAMINE 30 MG: 30 INJECTION, SOLUTION INTRAMUSCULAR; INTRAVENOUS at 15:27

## 2023-11-11 RX ADMIN — IBUPROFEN 800 MG: 400 TABLET ORAL at 21:52

## 2023-11-11 RX ADMIN — ACETAMINOPHEN 975 MG: 325 TABLET, FILM COATED ORAL at 01:25

## 2023-11-11 RX ADMIN — OXYCODONE HYDROCHLORIDE 5 MG: 5 TABLET ORAL at 19:14

## 2023-11-11 RX ADMIN — DOCUSATE SODIUM 50 MG AND SENNOSIDES 8.6 MG 1 TABLET: 8.6; 5 TABLET, FILM COATED ORAL at 21:52

## 2023-11-11 RX ADMIN — KETOROLAC TROMETHAMINE 30 MG: 30 INJECTION, SOLUTION INTRAMUSCULAR; INTRAVENOUS at 03:20

## 2023-11-11 RX ADMIN — DOCUSATE SODIUM 50 MG AND SENNOSIDES 8.6 MG 1 TABLET: 8.6; 5 TABLET, FILM COATED ORAL at 08:17

## 2023-11-11 RX ADMIN — ACETAMINOPHEN 975 MG: 325 TABLET, FILM COATED ORAL at 21:52

## 2023-11-11 ASSESSMENT — ACTIVITIES OF DAILY LIVING (ADL)
ADLS_ACUITY_SCORE: 20
ADLS_ACUITY_SCORE: 23
ADLS_ACUITY_SCORE: 23
ADLS_ACUITY_SCORE: 20
ADLS_ACUITY_SCORE: 20
ADLS_ACUITY_SCORE: 23
ADLS_ACUITY_SCORE: 20
ADLS_ACUITY_SCORE: 23
ADLS_ACUITY_SCORE: 20
ADLS_ACUITY_SCORE: 20

## 2023-11-11 NOTE — PLAN OF CARE
Anna Smith transferred to room 404 via cart at 2140 with baby in arms. Received report from Lary Grey RN at 2145. Belongings remain with patient and accompanied by nurse. Patient oriented to surroundings and bands double checked with handoff nurse. Patient tolerated transfer and is stable.

## 2023-11-11 NOTE — ANESTHESIA POSTPROCEDURE EVALUATION
Patient: Anna Smith    Procedure: Procedure(s):   section       Anesthesia Type:  Epidural    Note:     Postop Pain Control: Uneventful            Sign Out: Well controlled pain   PONV: No   Neuro/Psych: Uneventful            Sign Out: Acceptable/Baseline neuro status   Airway/Respiratory: Uneventful            Sign Out: Acceptable/Baseline resp. status   CV/Hemodynamics: Uneventful            Sign Out: Acceptable CV status; No obvious hypovolemia; No obvious fluid overload   Other NRE: NONE   DID A NON-ROUTINE EVENT OCCUR? No           Last vitals:  Vitals Value Taken Time   /85 11/10/23 2100   Temp 36.3  C (97.3  F) 11/10/23 1945   Pulse 92 11/10/23 2100   Resp 26 11/10/23 2100   SpO2 94 % 11/10/23 2100       Electronically Signed By: Delvin Rascon MD  November 10, 2023  10:51 PM

## 2023-11-11 NOTE — ANESTHESIA CARE TRANSFER NOTE
Patient: Anna Smith    Procedure: Procedure(s):   section       Diagnosis: Labor and delivery, indication for care [O75.9]  Diagnosis Additional Information: No value filed.    Anesthesia Type:   Epidural     Note:    Oropharynx: oropharynx clear of all foreign objects and spontaneously breathing  Level of Consciousness: awake  Oxygen Supplementation: room air    Independent Airway: airway patency satisfactory and stable  Dentition: dentition unchanged  Vital Signs Stable: post-procedure vital signs reviewed and stable  Report to RN Given: handoff report given  Patient transferred to: Labor and Delivery    Handoff Report: Identifed the Patient, Identified the Reponsible Provider, Reviewed the pertinent medical history, Discussed the surgical course, Reviewed Intra-OP anesthesia mangement and issues during anesthesia, Set expectations for post-procedure period and Allowed opportunity for questions and acknowledgement of understanding      Vitals:  Vitals Value Taken Time   BP     Temp     Pulse     Resp     SpO2         Electronically Signed By: CECIL Garrido CRNA  November 10, 2023  7:41 PM

## 2023-11-11 NOTE — PLAN OF CARE
Patient doing well, having adequate pain control with tylenol and ibuprofen PO.  Rash noted over abdomen and back.  Rash on back appears to be where tape was for epidural.  Voiding adequately.  Tolerating a regular diet, passing flatus.  Fundus firm below umbilicus, lochia scant, no clots.  Assisted with breast feeding positioning prn.  Will continue to monitor.

## 2023-11-11 NOTE — OP NOTE
Delivery Note    Surgeon(s) and Role:     * Sofie Catalan MD - Primary    Preoperative Diagnosis:  Intrauterine pregnancy at 39w2d (ADRIANO: 11/15/2023, by Patient Reported)  Arrest of dilation   AMA  IVF pregnancy     Postoperative Diagnosis:  Same    History: 43 year old now  at 39w2d who presented for induction in the setting of advanced maternal age and IVF pregnancy.  Patient underwent cervical ripening with multiple doses of Cytotec.  She progressed  to the active phase however made less than 2 cm of change over the course of 12 hours.  She did not progress past 9 cm over 3 hours.  There was significant caput.  Offered waiting an additional hour to see if patient made it to 10 cm, however, patient elected to proceed with  at that time.    Name of Operation: Primary Low Transverse  Section via Pfannensteil      FINDINGS:   Viable female infant, in Cephalic presentation but delivered breech. APGARS were 8 / 9  at 1 and 5 minutes respectively. Placenta with central 3 vessel cord. Normal uterus, bilateral tubes and ovaries.     QBL: Delivery QBL (mL): 555    Infant Birth Weight: 3.72 kg (8 lb 3.2 oz)     Informed Consent:  The risks, benefits, complications, and alternatives were discussed with the patient. The patient understood that the risks of  section include, but are not limited to: injury to nearby structures or organs, infection, blood loss and possible need for transfusion, and potential need for additional procedures. The patient stated understanding and desired to proceed. All questions were answered. The site of surgery was properly noted and marked. The patient was identified as Anna Smith and the procedure verified as a  delivery. A Time Out was held and the above information confirmed.    Procedure Details:  The patient was taken to the operating room where Epidural  anesthesia was found to be adequate. She was given 2 grams of Ancef and 500mg  of Azithromycin. She was then prepped and draped in the normal sterile fashion in the supine position with a leftward tilt. A Pfannenstiel skin incision was then made with the scalpel and carried through to the underlying layer of fascia. The fascia was incised in the midline and the incision extended laterally bluntly. The rectus muscles were then  in the midline, and the peritoneum was identified and entered bluntly. The peritoneal incision was then extended superiorly and inferiorly with good visualization of the bladder. The bladder blade was then inserted and the vesicouterine peritoneum identified. the bladder was clearly deliniated away from the proposed hysteromtomy and decision was made to proceed without a bladder flap.    A low transverse hysterotomy was made with the scalpel. The uterine incision was then extended bluntly using traction in the cephalocaudal direction.  Baby was in the cephalic presentation with the head deep in the pelvis.  The head was brought out of the pelvis into the hysterotomy, however, with abdominal pressure the baby flipped to transverse.  At this point the baby was delivered breech with a standard breech maneuvers.  The cord was clamped and cut and the infant was transferred to the warmer and attended to by the nursing team.    The placenta was then delivered using external massage. The uterus was exteriorized and cleared of all clots and debris. The uterine incision was repaired with 0-vicryl in a running, locked fashion. A second imbricating layer of the same suture was used to obtain excellent hemostasis. Good hemostasis was noted after hysterotomy closure.    The uterus was returned to the abdomen and the gutters were cleared of all clots and debris. The hysterotomy was examined in situ and hemostasis was satisfactory. The ventral aspect of the fascia was inspected and no fascial defects were found. The rectus muscles were inspected and found to be intact and  hemostatic The fascia was reapproximated with 0 PDS in a running fashion. The subcutaneous tissue was then irrigated and the bovie was used to obtain excellent hemostasis.  The skin was closed with absorbable staples.  The patient tolerated the procedure well and was taken to the recovery room in stable condition.    Complications: None  Sponge/Instrument/Needle Counts: The sponge, lap and needle counts were correct x3.       Sofie Catalan MD

## 2023-11-11 NOTE — PLAN OF CARE
Data: Anna Smith transferred to [facility] via cart at 2140. Baby transferred via parent's arms.  Action: Receiving unit notified of transfer: Yes. Patient and family notified of room change. Report given to KORINA Burton at 2145. Belongings sent to receiving unit. Accompanied by Registered Nurse. Oriented patient to surroundings. Call light within reach. ID bands double-checked with receiving RN.  Response: Patient tolerated transfer and is stable.

## 2023-11-11 NOTE — PROGRESS NOTES
OB Postpartum Note    S:  Patient without complaints. Nausea controlled, tolerating regular diet. Ambulating, voiding without difficulty. Passing flatus. Minimal lochia. Pain controlled. Feeding baby: Breastfeeding    O:  Vitals were reviewed  Blood pressure 118/80, pulse 74, temperature 97.6  F (36.4  C), temperature source Oral, resp. rate 16, SpO2 98%, unknown if currently breastfeeding.        Gen - NAD, resting        CV - RRR        Abdomen - Fundus firm, below umbilicus, nontender        Incision - C/D/I        Extremities - No calf tenderness, no unilateral edema    A NEG  Hemoglobin   Date Value Ref Range Status   11/11/2023 11.7 11.7 - 15.7 g/dL Final   11/08/2023 12.0 11.7 - 15.7 g/dL Final   11/07/2018 15.1 11.7 - 15.7 g/dL Final         A:   Postoperative Day #1 , s/p primary CS for arrest- doing well    P:  1)  Routine care        2)  Discussed ambulation, Hgb stable.        Sofía Ag MD

## 2023-11-11 NOTE — PLAN OF CARE
Vital signs stable. Postpartum assessment within normal limits. Incision covered. Pain controlled with tylenol and ketorolac. Patient ambulating with standby assist. Breastfeeding on cue with minimal assist. Patient and infant bonding well. Will continue with current plan of care.

## 2023-11-12 PROCEDURE — 250N000013 HC RX MED GY IP 250 OP 250 PS 637: Performed by: STUDENT IN AN ORGANIZED HEALTH CARE EDUCATION/TRAINING PROGRAM

## 2023-11-12 PROCEDURE — 120N000012 HC R&B POSTPARTUM

## 2023-11-12 RX ADMIN — DOCUSATE SODIUM 50 MG AND SENNOSIDES 8.6 MG 2 TABLET: 8.6; 5 TABLET, FILM COATED ORAL at 09:48

## 2023-11-12 RX ADMIN — IBUPROFEN 800 MG: 400 TABLET ORAL at 03:32

## 2023-11-12 RX ADMIN — IBUPROFEN 800 MG: 400 TABLET ORAL at 15:36

## 2023-11-12 RX ADMIN — IBUPROFEN 800 MG: 400 TABLET ORAL at 09:48

## 2023-11-12 RX ADMIN — ACETAMINOPHEN 975 MG: 325 TABLET, FILM COATED ORAL at 21:01

## 2023-11-12 RX ADMIN — ACETAMINOPHEN 975 MG: 325 TABLET, FILM COATED ORAL at 09:48

## 2023-11-12 RX ADMIN — IBUPROFEN 800 MG: 400 TABLET ORAL at 21:01

## 2023-11-12 RX ADMIN — ACETAMINOPHEN 975 MG: 325 TABLET, FILM COATED ORAL at 03:32

## 2023-11-12 RX ADMIN — DOCUSATE SODIUM 50 MG AND SENNOSIDES 8.6 MG 1 TABLET: 8.6; 5 TABLET, FILM COATED ORAL at 20:01

## 2023-11-12 RX ADMIN — OXYCODONE HYDROCHLORIDE 5 MG: 5 TABLET ORAL at 12:24

## 2023-11-12 RX ADMIN — ACETAMINOPHEN 975 MG: 325 TABLET, FILM COATED ORAL at 15:36

## 2023-11-12 ASSESSMENT — ACTIVITIES OF DAILY LIVING (ADL)
ADLS_ACUITY_SCORE: 23
ADLS_ACUITY_SCORE: 20

## 2023-11-12 NOTE — LACTATION NOTE
This note was copied from a baby's chart.  Follow up lactation visit with NANO Noe and baby girl Marina. Infant breastfeeding at time of visit. Infant latched well in cross cradle hold, nutritive suck/swallow noted. Kusum experiencing very sore nipples, redness and cracks visible. Patient has hydrogel as well as lanolin and sore nipple shells. Pt feels increased comfort from last night when infant was cluster feeding. Reviewed hand expression, drops of colostrum from left breast. Encouraged frequent skin to skin, breastfeeding on demand at least every 3 hours. Will follow as needed.

## 2023-11-12 NOTE — PLAN OF CARE
Patient doing well, having adequate pain control with tylenol and ibuprofen PO.  Occasionally taking oxycodone PO.  Vital signs stable.  Ambulating hallways without difficulty.  Assisted with breast feeding positioning prn.  Sore nipple shells given to alternate with hydrogels.  Encouraged to call with questions/concerns.

## 2023-11-12 NOTE — LACTATION NOTE
Lactation visit with KusumNANO, and baby girl.    Infant has been very sleepy in her first 24 hours. Has had mostly attempts at breast, she has also been fairly spitty. Kusum has practiced hand expression and given expressed drops back to infant.    At time of visit, we worked on a breast feeding session. Infant roots towards the nipple (we have infant positioned in cross cradle), show Kusum how to sandwich her breast tissue and help guide infant over nipple to latch. Infant will latch and suckle once or twice and then unlatch herself, she demonstrated this pattern repeatedly. After much positioning and guidance, LC unable to help infant maintain her latch. 24mm Nipple shield introduced. The nipple shield does not help infant to maintain her latch. Since infant is close to 24 hours old.  suggests Kusum start pumping for adequate breast stimulation and foreshadows that infant may need supplementation if she doesn't begin latching and suckling for sustained length of time.    Set up Medela breast pump and showed how to pump in Initiate Mode. Fit Kusum to pump with 21mm flanges. Kusum pumped about 1/2ml of colostrum which we fed back to infant.    Suggested feeding plan overnight:  1) Bring infant to breast and practice breastfeeding  2) If infant sustains latch and suckles--no need to pump. However if infant sleepy, then Kusum should pump.  3) Begin to supplement infant, parents choice of DBM or formula if infant continues to miss feedings.      Discussed  breastfeeding basics:   1. Watch for early feeding cues (licking lips, stirring or rooting, sucking movement with mouth, hands to mouth).  2. Infant should breastfeed on demand and a minimum of 8 times in 24 hours. Encourage/offer to breastfeed infant at least 3 hours (from the start of the last feeding). Encouraged to utilize RN support with breastfeeding.      Educated on techniques to wake a sleepy baby for feedings: un-swaddle infant, check infant's  "diaper, begin snuggling skin to skin and begin gentle stimulation including stroking infant's back and feet.     Reviewed breast feeding section in our \"Guide to Postpartum and [place] Care.\" Highlighting pages that educates to  feeding patterns/behavior: Emphasizing on day 1 infant may be more sleepy (the birthday nap); followed by a cluster-feeding (breastfeeding marathon) pattern on second day/night. We reviewed at the feeding log in back of booklet, how/why tracking infant's feedings and wet/dirty diapers is important. Provided Zaki suggestions for tracking beyond day 5.     Discussed physiology of milk production from colostrum through milk \"coming in\" between day 3-5 (typically). Answered questions regarding \"how to know when infant is done at the breast\". Reviewed [place] outpatient lactation resources. Appreciative of visit.    Parul Segundo RN, IBCLC          "

## 2023-11-12 NOTE — PROGRESS NOTES
OB Postpartum Note    S:  Patient without complaints. Nausea controlled, tolerating regular diet. Ambulating, voiding without difficulty. Passing flatus. Minimal lochia. Pain controlled. Feeding baby: Breastfeeding    O:  Vitals were reviewed  Blood pressure 113/66, pulse 70, temperature 97.6  F (36.4  C), temperature source Oral, resp. rate 16, SpO2 98%, unknown if currently breastfeeding.        Gen - NAD, resting        CV - RRR        Abdomen - Fundus firm, below umbilicus, nontender        Incision - C/D/I        Extremities - No calf tenderness, no unilateral edema    A NEG  Hemoglobin   Date Value Ref Range Status   11/11/2023 11.7 11.7 - 15.7 g/dL Final   11/08/2023 12.0 11.7 - 15.7 g/dL Final   11/07/2018 15.1 11.7 - 15.7 g/dL Final         A:   Postoperative Day #2 , s/p CS - doing well    P:  1)  Routine care--reviewed normal recovery strategies.         2)  Anticipate discharge tomorrow.        Sofía Ag MD

## 2023-11-12 NOTE — PLAN OF CARE
Vital signs stable. Postpartum assessment WDL. Incision open to air without drainage. Pain controlled with tylenol and ibuprofen. Patient ambulating independently. Breastfeeding on cue with minimal assist. Patient and infant bonding well. Will continue with current plan of care.

## 2023-11-13 VITALS
HEART RATE: 65 BPM | DIASTOLIC BLOOD PRESSURE: 76 MMHG | TEMPERATURE: 97.9 F | WEIGHT: 133.5 LBS | SYSTOLIC BLOOD PRESSURE: 126 MMHG | BODY MASS INDEX: 22.56 KG/M2 | OXYGEN SATURATION: 98 % | RESPIRATION RATE: 16 BRPM

## 2023-11-13 PROCEDURE — 250N000013 HC RX MED GY IP 250 OP 250 PS 637: Performed by: STUDENT IN AN ORGANIZED HEALTH CARE EDUCATION/TRAINING PROGRAM

## 2023-11-13 RX ORDER — OXYCODONE HYDROCHLORIDE 5 MG/1
5 TABLET ORAL EVERY 4 HOURS PRN
Qty: 15 TABLET | Refills: 0 | Status: SHIPPED | OUTPATIENT
Start: 2023-11-13 | End: 2023-11-13

## 2023-11-13 RX ORDER — OXYCODONE HYDROCHLORIDE 5 MG/1
5 TABLET ORAL EVERY 4 HOURS PRN
Qty: 15 TABLET | Refills: 0 | Status: SHIPPED | OUTPATIENT
Start: 2023-11-13

## 2023-11-13 RX ADMIN — IBUPROFEN 800 MG: 400 TABLET ORAL at 05:18

## 2023-11-13 RX ADMIN — DOCUSATE SODIUM 50 MG AND SENNOSIDES 8.6 MG 1 TABLET: 8.6; 5 TABLET, FILM COATED ORAL at 08:22

## 2023-11-13 RX ADMIN — ACETAMINOPHEN 975 MG: 325 TABLET, FILM COATED ORAL at 05:18

## 2023-11-13 RX ADMIN — ACETAMINOPHEN 975 MG: 325 TABLET, FILM COATED ORAL at 11:23

## 2023-11-13 RX ADMIN — IBUPROFEN 800 MG: 400 TABLET ORAL at 11:21

## 2023-11-13 ASSESSMENT — ACTIVITIES OF DAILY LIVING (ADL)
ADLS_ACUITY_SCORE: 20

## 2023-11-13 NOTE — PLAN OF CARE
Vital signs stable. Postpartum assessment WDL. Incision open to air. Pain controlled with tylenol and ibuprofen. Patient ambulating independently. Breastfeeding on cue with minimal assist. Nipples are bloody and provided hydrogels and lanolin cream. Patient and infant bonding well. Will continue with current plan of care.

## 2023-11-13 NOTE — LACTATION NOTE
Follow up Lactation visit with Kusum & baby girl Marina. Getting ready for discharge. Kusum reports feeding is going better, breastfeeding, then supplementing with formula via bottle after breastfeeds as of last night overnight if Marina doesn't seem content after breastfeeding. Kusum shared Marina's weight loss was at approximately 9% down overnight. Kusum's nipples were cracked, were bleeding, and had been very sore, but Kusum shared she feels they are improving a bit. Using hydrogels after feeding.     Discussed cluster feeding, what it is and when to expect it, The Second Night, satiety cues, feeding cues, and reviewed Feeding Log for home use. Encouraged to review Breastfeeding section in Your Guide to Postpartum & [place] Care.    Reviewed milk supply and engorgement. Reviewed typical timeline of milk supply initiation and progression over first 3-5 days postpartum. Discussed comfort measures for engorgement, plugged duct treatment, and warning signs of breast infection. General questions answered regarding pumping, when it's helpful and necessary. Discussed pumping after feedings for 10-15 minutes, while supplementing, for stimulation. Stressed ensuring pumping is comfortable, with nipple soreness.    Feeding plan: Recommend unlimited, frequent breast feedings: At least 8 - 12 times every 24 hours.  Kusum to pump after feedings, while needing to supplement with formula. Encouraged use of feeding log and to record feedings, and void/stool patterns. Kusum has a breast pump for home use. Follow up with Tameka Colon, encouraged follow up with Lactation in clinic. Reviewed outpatient lactation resources. Kusum very appreciative of visit.    Keesha Valentine, RN, BSN, MNN, IBCLC

## 2023-11-13 NOTE — PROGRESS NOTES
November 13, 2023      SUBJECTIVE: No acute overnight events.  Pain adequately controlled.  +Lochia, light.  Tolerating PO.  + Flatus.  Ambulating/urinating w/o difficulty.  Denies CP/palp/SOB/LH.    OBJECTIVE: /73 (BP Location: Right arm, Patient Position: Semi-Faustin's, Cuff Size: Adult Regular)   Pulse 64   Temp 97.9  F (36.6  C) (Oral)   Resp 16   SpO2 98%   Breastfeeding Unknown   Gen: NAD, A&O x3  Abd: soft.  Incision C/D/I, no active bleeding.  No erythema, induration, or discharge  Ext: mild edema BL LEs, symmetric, no CT    Hemoglobin   Date Value Ref Range Status   11/11/2023 11.7 11.7 - 15.7 g/dL Final   11/08/2023 12.0 11.7 - 15.7 g/dL Final   11/07/2018 15.1 11.7 - 15.7 g/dL Final   ]    A/P: POD#3 s/p primary LTCS for arrest of dilation.  Doing well.  Afebrile, VSS.  - ibuprofen/oxycodone/tylenol PRN pain  - regular diet as tolerated  - breastfeeding  - encouraged ambulation  - routine post-op care  - discharge home today, f/u in 2 and 6 weeks for postop exam, or sooner PRN        ZAINAB WEINER MD

## 2023-11-13 NOTE — PLAN OF CARE
Fundus firm and bleeding wnl.  VSS.  Incision approximated with steri strips, no drainage.  Pain controlled with tylenol and ibuprofen. Discharge medications given to patient, pt prescribed oxycodone as needed. Up independently.  Using abdominal binder.  Passing flatus and tolerating regular diet. Discharge paperwork reviewed, questions and concerns answered.  Plan to follow up as scheduled/directed, pt discharging home with baby and .

## 2023-11-13 NOTE — DISCHARGE INSTRUCTIONS
You should be on pelvic rest with no heavy lifting >25 lb for 6 weeks.  Please call or return if you have fever >/= 100.4 degrees Farenheit (38.0 degrees Celsius), severe worsening abdominal/pelvic pain not relieved by oral pain medications, heavy persistent vaginal bleeding, chest pain, palpitations, shortness of breath, dizziness, depressed mood, or for any other major concern.  You may use over the counter ibuprofen (600 mg every 6 hours) and acetaminophen/tylenol (500-650 mg every 6 hours) as needed for pain.  You may also use a stool softener (eg. Colace/Docusate 100 mg 1-2 tabs per day) as needed for constipation.  If you need to, you can take oxycodone 5 mg PO every 4-6 hours as needed for more severe pain. These are safe with breastfeeding.    Please call the office to schedule a routine postpartum examination in 2 AND 6 weeks, or sooner if instructed so by your physician.  If you are feeling well and have had no blood pressure issues, your 2 week follow-up appointment can be a virtual/tele-health appointment.  If you had gestational diabetes during pregnancy, then you must also schedule a 2 hour glucose test during your final 6 week postpartum examination.        Postop  Birth Instructions    Activity     Do not lift more than 10 pounds for 6 weeks after surgery.  Ask family and friends for help when you need it.  No driving until you have stopped taking your pain medications (usually two weeks after surgery).  No heavy exercise or activity for 6 weeks.  Don't do anything that will put a strain on your surgery site.  Don't strain when using the toilet.  Your care team may prescribe a stool softener if you have problems with your bowel movements.     To care for your incision:     Keep the incision clean and dry.  Do not soak your incision in water. No swimming or hot tubs until it has fully healed. You may soak in the bathtub if the water level is below your incision.  Do not use peroxide, gel,  cream, lotion, or ointment on your incision.  Adjust your clothes to avoid pressure on your surgery site (check the elastic in your underwear for example).     You may see a small amount of clear or pink drainage and this is normal.  Check with your health care provider:     If the drainage increases or has an odor.  If the incision reddens, you have swelling, or develop a rash.  If you have increased pain and the medicine we prescribed doesn't help.  If you have a fever above 100.4 F (38 C) with or without chills when placing thermometer under your tongue.   The area around your incision (surgery wound), will feel numb.  This is normal. The numbness should go away in less than a year.     Keep your hands clean:  Always wash your hands before touching your incision (surgery wound). This helps reduce your risk of infection. If your hands aren't dirty, you may use an alcohol hand-rub to clean your hands. Keep your nails clean and short.    Call your healthcare provider if you have any of these symptoms:     You soak a sanitary pad with blood within 1 hour, or you see blood clots larger than a golf ball.  Bleeding that lasts more than 6 weeks.  Vaginal discharge that smells bad.  Severe pain, cramping or tenderness in your lower belly area.  A need to urinate more frequently (use the toilet more often), more urgently (use the toilet very quickly), or it burns when you urinate.  Nausea and vomiting.  Redness, swelling or pain around a vein in your leg.  Problems breastfeeding or a red or painful area on your breast.  Chest pain and cough or are gasping for air.  Problems with coping with sadness, anxiety or depression. If you have concerns about hurting yourself or the baby, call your provider immediately.    You have questions or concerns after you return home.

## 2023-12-27 NOTE — DISCHARGE SUMMARY
DATE OF ADMISSION: 2023    DATE OF DISCHARGE: 2023    ADMISSION DIAGNOSIS:  Intrauterine pregnancy at 39 w 1 d  2.   Induction of labor   3.   IVF pregnancy  4.   AMA    DISCHARGE DIAGNOSIS:  Intrauterine pregnancy at 39 w 1 d  2.   Induction of labor   3.   IVF pregnancy  4.   AMA  5.   Status post primary low transverse  section for arrest of dilation      SUMMARY OF HOSPITAL COURSE:   The patient is a 43 year old  who was admitted to labor and delivery at 39w1d for an induction of labor due to IVF pregnancy and AMA.  She subsequently underwent a primary LTCS due to arrest of dilation.  Please see operative report for further details.  Over the course of her stay, she remained afebrile with stable vital signs. She made satisfactory advancement in diet and ambulation with return of bowel function and adequate pain control.  Vitals were stable and her discharge labwork was stable.  She was discharged home on 2023.    CONDITION ON DISCHARGE: Stable    MEDICATIONS ON DISCHARGE:      Medication List        Started      oxyCODONE 5 MG tablet  Commonly known as: ROXICODONE  5 mg, Oral, EVERY 4 HOURS PRN            Discontinued      aspirin 81 MG chewable tablet  Commonly known as: ASA     metroNIDAZOLE 1 % external gel  Commonly known as: METROGEL              DISCHARGE INSTRUCTIONS: The patient was instructed to be on pelvic rest with no heavy lifting >25 lb for 6 weeks.  She was instructed to schedule a 2 and 6 week postop/postpartum exam.She was instructed to call or return sooner if she has fever >/= 100.4 degrees Farenheit (38.0 degrees Celsius), severe worsening abdominal pain (not relieved by oral pain medications), heavy persistent vaginal bleeding, chest pain, palpitations, shortness of breath, dizziness, depressed mood, or for any other major concern.      ZAINAB WEINER MD

## 2024-06-01 ENCOUNTER — HEALTH MAINTENANCE LETTER (OUTPATIENT)
Age: 44
End: 2024-06-01

## 2024-09-30 ENCOUNTER — NURSE TRIAGE (OUTPATIENT)
Dept: FAMILY MEDICINE | Facility: CLINIC | Age: 44
End: 2024-09-30

## 2024-09-30 ENCOUNTER — OFFICE VISIT (OUTPATIENT)
Dept: FAMILY MEDICINE | Facility: CLINIC | Age: 44
End: 2024-09-30
Payer: COMMERCIAL

## 2024-09-30 VITALS
SYSTOLIC BLOOD PRESSURE: 116 MMHG | HEIGHT: 64 IN | HEART RATE: 84 BPM | OXYGEN SATURATION: 99 % | WEIGHT: 98.6 LBS | DIASTOLIC BLOOD PRESSURE: 62 MMHG | TEMPERATURE: 98.6 F | BODY MASS INDEX: 16.83 KG/M2

## 2024-09-30 DIAGNOSIS — S61.305A AVULSION OF NAIL OF LEFT RING FINGER: Primary | ICD-10-CM

## 2024-09-30 PROCEDURE — 11730 AVULSION NAIL PLATE SIMPLE 1: CPT | Mod: F3 | Performed by: FAMILY MEDICINE

## 2024-09-30 PROCEDURE — 99203 OFFICE O/P NEW LOW 30 MIN: CPT | Mod: 25 | Performed by: FAMILY MEDICINE

## 2024-09-30 NOTE — PROGRESS NOTES
"  Assessment & Plan   Avulsion of nail of left ring finger  Patient given option of putting partially by his nail back under the cuticle versus full removal and she chose the latter.  see procedure note  - REMOVAL OF NAIL PLATE SIMPLE SINGLE    Return in about 1 week (around 10/7/2024) for symptoms failing to improve or sooner if worsening.          Jordan Pradhan MD      [address] [address]  [email or web address]   Office: [phone]       Subjective   Kusum is a 44 year old, presenting for the following health issues:  Finger (Ring finger left hand)        9/30/2024     2:23 PM   Additional Questions   Roomed by Beulah GARCIA CMA   Accompanied by self     HPI     Situation   patient got her left finger in the storm door today.      Background   Last tdap was 2023 per [facility]     Assessment   She has acyclic nails on and the real nail lifted from the nail bed   Denied bleeding or open skin  Denied finger swelling      Patient states it does look like there is blood under the nail      She states about 1/2 the real nail is partially detached, looks like detached/damaged  down by cutical of nail.  Some Redness is present on one side of the finger       Painful when she touches her finger      Recommendations   Tylenol and or ibuprofen as directed/needed for pain   Ice 20 minutes 3-5 times  a day   Scheduled, advised of arrival time        Objective    /62   Pulse 84   Temp 98.6  F (37  C) (Tympanic)   Ht 1.626 m (5' 4\")   Wt 44.7 kg (98 lb 9.6 oz)   LMP 09/30/2024 (Exact Date)   SpO2 99%   Breastfeeding No   BMI 16.92 kg/m    Body mass index is 16.92 kg/m .  Physical Exam   GENERAL: alert and no distress  MS: no gross musculoskeletal defects noted, no edema  SKIN: left 4th nail partial avulsed and proximal nail on top of the cuticle - ulnar aspect ,otherwise no suspicious lesions or rashes  NEURO: Normal strength and tone, mentation intact and speech " normal    Risks including but not limited to pain, infection, bleeding and abnormal nail regrowth were reviewed with the patient.  Informed consent is obtained. Using a 1% plain lidocaine, a digital nerve block was done (4 cc total) and subsequent local anesthesia due to ongoing pain. Using a tourniquet for hemostasis and sterile instruments, I freed the nail from the nail bed and matrix. This was well tolerated, minimal bleeding. Antibiotic ointment and a dressing are applied. Remove the dressing tomorrow.  Call if pain, erythema fever or bleeding. Wound care and dressing instructions are given.          Patient is initially seen by Aarti Jackson PA-C who has for assistance with above procedure

## 2024-09-30 NOTE — TELEPHONE ENCOUNTER
Situation   patient got her left finger in the storm door today.     Background   Last tdap was 2023 per [facility]    Assessment    She has acyclic nails on and the real nail lifted from the nail bed   Denied bleeding or open skin  Denied finger swelling     Patient states it does look like there is blood under the nail     She states about 1/2 the real nail is partially detached, looks like detached/damaged  down by cutical of nail.  Some Redness is present on one side of the finger      Painful when she touches her finger     Recommendations   Tylenol and or ibuprofen as directed/needed for pain   Ice 20 minutes 3-5 times  a day   Scheduled, advised of arrival time     Future Appointments 9/30/2024 - 3/29/2025        Date Visit Type Length Department Provider     9/30/2024  2:30 PM OFFICE VISIT 30 min  FAMILY PRACTICE  Arrive at:  FAMILY PRACTICE Aarti Jackson, JELENA    Location Instructions:     [facility] is located at [address] [place] [address], along [address] Free parking is available; access the lot by turning north from [address] onto [address], then west onto [address].                     Reason for Disposition   Base of fingernail has popped out from under skin fold (cuticle)    Additional Information   Negative: Major bleeding (actively dripping or spurting) that can't be stopped   Negative: Sounds like a life-threatening emergency to the triager   Negative: Wound looks infected   Negative: Caused by animal bite   Negative: Amputation   Negative: High-pressure injection injury (e.g., from paint gun, usually work-related)   Negative: Looks like a broken bone or dislocated joint (e.g., crooked or deformed)   Negative: Skin is split open or gaping (length > 1/2 inch or 12 mm)   Negative: Cut or scrape is very deep (e.g., can see bone or tendons)   Negative: Bleeding won't stop after 10 minutes of direct pressure (using correct technique)   Negative: Dirt in the  wound and not removed after 15 minutes of scrubbing   Negative: Cut with numbness (loss of sensation) of finger   Negative: Fingernail is partially torn from a crush injury  (Exception: Torn nail from catching it on something.)   Negative: Sounds like a serious injury to the triager   Negative: Looks infected (e.g., spreading redness, red streak, pus)   Commented on: Fingernail is completely torn off     1/2    Protocols used: Finger Injury-A-[place]

## 2024-12-22 ENCOUNTER — HEALTH MAINTENANCE LETTER (OUTPATIENT)
Age: 44
End: 2024-12-22

## 2025-01-10 ENCOUNTER — TRANSFERRED RECORDS (OUTPATIENT)
Dept: MULTI SPECIALTY CLINIC | Facility: CLINIC | Age: 45
End: 2025-01-10

## 2025-01-10 LAB — PAP SMEAR - HIM PATIENT REPORTED: NORMAL

## 2025-02-26 ENCOUNTER — HOSPITAL ENCOUNTER (OUTPATIENT)
Dept: MAMMOGRAPHY | Facility: CLINIC | Age: 45
Discharge: HOME | End: 2025-02-26
Attending: FAMILY MEDICINE
Payer: COMMERCIAL

## 2025-02-26 DIAGNOSIS — Z12.31 VISIT FOR SCREENING MAMMOGRAM: ICD-10-CM

## 2025-02-26 PROCEDURE — 77063 BREAST TOMOSYNTHESIS BI: CPT

## 2025-03-06 ENCOUNTER — PATIENT OUTREACH (OUTPATIENT)
Dept: ONCOLOGY | Facility: CLINIC | Age: 45
End: 2025-03-06

## 2025-03-06 ENCOUNTER — MYC MEDICAL ADVICE (OUTPATIENT)
Dept: FAMILY MEDICINE | Facility: CLINIC | Age: 45
End: 2025-03-06

## 2025-03-06 ENCOUNTER — OFFICE VISIT (OUTPATIENT)
Dept: FAMILY MEDICINE | Facility: CLINIC | Age: 45
End: 2025-03-06
Payer: COMMERCIAL

## 2025-03-06 VITALS
DIASTOLIC BLOOD PRESSURE: 82 MMHG | SYSTOLIC BLOOD PRESSURE: 114 MMHG | OXYGEN SATURATION: 99 % | TEMPERATURE: 97.2 F | RESPIRATION RATE: 16 BRPM | BODY MASS INDEX: 16.39 KG/M2 | HEIGHT: 64 IN | HEART RATE: 119 BPM | WEIGHT: 96 LBS

## 2025-03-06 DIAGNOSIS — Z87.42 HISTORY OF ABNORMAL CERVICAL PAP SMEAR: ICD-10-CM

## 2025-03-06 DIAGNOSIS — Z11.59 ENCOUNTER FOR HEPATITIS C SCREENING TEST FOR LOW RISK PATIENT: ICD-10-CM

## 2025-03-06 DIAGNOSIS — Z11.59 NEED FOR HEPATITIS C SCREENING TEST: ICD-10-CM

## 2025-03-06 DIAGNOSIS — Z13.6 CARDIOVASCULAR SCREENING; LDL GOAL LESS THAN 160: ICD-10-CM

## 2025-03-06 DIAGNOSIS — Z12.11 SCREEN FOR COLON CANCER: ICD-10-CM

## 2025-03-06 DIAGNOSIS — Z80.0 FAMILY HISTORY OF COLON CANCER: ICD-10-CM

## 2025-03-06 DIAGNOSIS — Z01.419 ENCOUNTER FOR GYNECOLOGICAL EXAMINATION WITHOUT ABNORMAL FINDING: ICD-10-CM

## 2025-03-06 DIAGNOSIS — Z80.3 FAMILY HISTORY OF BREAST CANCER: ICD-10-CM

## 2025-03-06 DIAGNOSIS — Z00.00 ROUTINE GENERAL MEDICAL EXAMINATION AT A HEALTH CARE FACILITY: Primary | ICD-10-CM

## 2025-03-06 LAB
ALBUMIN SERPL BCG-MCNC: 4.9 G/DL (ref 3.5–5.2)
ALP SERPL-CCNC: 49 U/L (ref 40–150)
ALT SERPL W P-5'-P-CCNC: 11 U/L (ref 0–50)
ANION GAP SERPL CALCULATED.3IONS-SCNC: 13 MMOL/L (ref 7–15)
AST SERPL W P-5'-P-CCNC: 21 U/L (ref 0–45)
BILIRUB SERPL-MCNC: 0.6 MG/DL
BUN SERPL-MCNC: 15.9 MG/DL (ref 6–20)
CALCIUM SERPL-MCNC: 9.4 MG/DL (ref 8.8–10.4)
CHLORIDE SERPL-SCNC: 104 MMOL/L (ref 98–107)
CHOLEST SERPL-MCNC: 235 MG/DL
CREAT SERPL-MCNC: 0.74 MG/DL (ref 0.51–0.95)
EGFRCR SERPLBLD CKD-EPI 2021: >90 ML/MIN/1.73M2
ERYTHROCYTE [DISTWIDTH] IN BLOOD BY AUTOMATED COUNT: 13.8 % (ref 10–15)
FASTING STATUS PATIENT QL REPORTED: YES
FASTING STATUS PATIENT QL REPORTED: YES
GLUCOSE SERPL-MCNC: 64 MG/DL (ref 70–99)
HCO3 SERPL-SCNC: 23 MMOL/L (ref 22–29)
HCT VFR BLD AUTO: 44.4 % (ref 35–47)
HCV AB SERPL QL IA: NONREACTIVE
HDLC SERPL-MCNC: 103 MG/DL
HGB BLD-MCNC: 14.8 G/DL (ref 11.7–15.7)
LDLC SERPL CALC-MCNC: 121 MG/DL
MCH RBC QN AUTO: 28.3 PG (ref 26.5–33)
MCHC RBC AUTO-ENTMCNC: 33.3 G/DL (ref 31.5–36.5)
MCV RBC AUTO: 85 FL (ref 78–100)
NONHDLC SERPL-MCNC: 132 MG/DL
PLATELET # BLD AUTO: 265 10E3/UL (ref 150–450)
POTASSIUM SERPL-SCNC: 3.8 MMOL/L (ref 3.4–5.3)
PROT SERPL-MCNC: 7.9 G/DL (ref 6.4–8.3)
RBC # BLD AUTO: 5.23 10E6/UL (ref 3.8–5.2)
SODIUM SERPL-SCNC: 140 MMOL/L (ref 135–145)
TRIGL SERPL-MCNC: 57 MG/DL
TSH SERPL DL<=0.005 MIU/L-ACNC: 1.8 UIU/ML (ref 0.3–4.2)
WBC # BLD AUTO: 5.9 10E3/UL (ref 4–11)

## 2025-03-06 SDOH — HEALTH STABILITY: PHYSICAL HEALTH: ON AVERAGE, HOW MANY DAYS PER WEEK DO YOU ENGAGE IN MODERATE TO STRENUOUS EXERCISE (LIKE A BRISK WALK)?: 4 DAYS

## 2025-03-06 ASSESSMENT — ANXIETY QUESTIONNAIRES
7. FEELING AFRAID AS IF SOMETHING AWFUL MIGHT HAPPEN: NOT AT ALL
2. NOT BEING ABLE TO STOP OR CONTROL WORRYING: NOT AT ALL
6. BECOMING EASILY ANNOYED OR IRRITABLE: NOT AT ALL
5. BEING SO RESTLESS THAT IT IS HARD TO SIT STILL: NOT AT ALL
IF YOU CHECKED OFF ANY PROBLEMS ON THIS QUESTIONNAIRE, HOW DIFFICULT HAVE THESE PROBLEMS MADE IT FOR YOU TO DO YOUR WORK, TAKE CARE OF THINGS AT HOME, OR GET ALONG WITH OTHER PEOPLE: NOT DIFFICULT AT ALL
3. WORRYING TOO MUCH ABOUT DIFFERENT THINGS: NOT AT ALL
GAD7 TOTAL SCORE: 0
1. FEELING NERVOUS, ANXIOUS, OR ON EDGE: NOT AT ALL
GAD7 TOTAL SCORE: 0

## 2025-03-06 ASSESSMENT — PATIENT HEALTH QUESTIONNAIRE - PHQ9
5. POOR APPETITE OR OVEREATING: NOT AT ALL
SUM OF ALL RESPONSES TO PHQ QUESTIONS 1-9: 2

## 2025-03-06 ASSESSMENT — SOCIAL DETERMINANTS OF HEALTH (SDOH): HOW OFTEN DO YOU GET TOGETHER WITH FRIENDS OR RELATIVES?: THREE TIMES A WEEK

## 2025-03-06 NOTE — PROGRESS NOTES
Writer received referral to Cancer Risk Management/Genetic Counseling.    Referred for:   strong family history of colon cancer - MGF, PGM and PGF and Paternal Aunt - at age 47 - aunt  at age 48     Referred By    Provider Department Location Phone   Maribeth Sanches MD [facility] [place] [phone]       Referral reviewed for appropriate plan, and sent to New Patient Scheduling ([phone]) for completion.    Cristina Church, RN, BSN  Oncology New Patient Nurse Navigator   [facility] [place]

## 2025-03-06 NOTE — TELEPHONE ENCOUNTER
LOV 3/6/25    RBC: 5.23    Routing to provider to review and advise.     Renay Atkins RN   [facility]

## 2025-03-06 NOTE — PATIENT INSTRUCTIONS
[facility]  [address] [place] [id number]  Office: [phone]   Fax:    [phone]         Patient Education   Preventive Care Advice   This is general advice given by our system to help you stay healthy. However, your care team may have specific advice just for you. Please talk to your care team about your preventive care needs.  Nutrition  Eat 5 or more servings of fruits and vegetables each day.  Try wheat bread, brown rice and whole grain pasta (instead of white bread, rice, and pasta).  Get enough calcium and vitamin D. Check the label on foods and aim for 100% of the RDA (recommended daily allowance).  Lifestyle  Exercise at least 150 minutes each week  (30 minutes a day, 5 days a week).  Do muscle strengthening activities 2 days a week. These help control your weight and prevent disease.  No smoking.  Wear sunscreen to prevent skin cancer.  Have a dental exam and cleaning every 6 months.  Yearly exams  See your health care team every year to talk about:  Any changes in your health.  Any medicines your care team has prescribed.  Preventive care, family planning, and ways to prevent chronic diseases.  Shots (vaccines)   HPV shots (up to age 26), if you've never had them before.  Hepatitis B shots (up to age 59), if you've never had them before.  COVID-19 shot: Get this shot when it's due.  Flu shot: Get a flu shot every year.  Tetanus shot: Get a tetanus shot every 10 years.  Pneumococcal, hepatitis A, and RSV shots: Ask your care team if you need these based on your risk.  Shingles shot (for age 50 and up)  General health tests  Diabetes screening:  Starting at age 35, Get screened for diabetes at least every 3 years.  If you are younger than age 35, ask your care team if you should be screened for diabetes.  Cholesterol test: At age 39, start having a cholesterol test every 5 years, or more often if advised.  Bone density scan (DEXA): At age 50, ask your care team if  you should have this scan for osteoporosis (brittle bones).  Hepatitis C: Get tested at least once in your life.  STIs (sexually transmitted infections)  Before age 24: Ask your care team if you should be screened for STIs.  After age 24: Get screened for STIs if you're at risk. You are at risk for STIs (including HIV) if:  You are sexually active with more than one person.  You don't use condoms every time.  You or a partner was diagnosed with a sexually transmitted infection.  If you are at risk for HIV, ask about PrEP medicine to prevent HIV.  Get tested for HIV at least once in your life, whether you are at risk for HIV or not.  Cancer screening tests  Cervical cancer screening: If you have a cervix, begin getting regular cervical cancer screening tests starting at age 21.  Breast cancer scan (mammogram): If you've ever had breasts, begin having regular mammograms starting at age 40. This is a scan to check for breast cancer.  Colon cancer screening: It is important to start screening for colon cancer at age 45.  Have a colonoscopy test every 10 years (or more often if you're at risk) Or, ask your provider about stool tests like a FIT test every year or Cologuard test every 3 years.  To learn more about your testing options, visit:   .  For help making a decision, visit:   https://bit.ly/jf65957.  Prostate cancer screening test: If you have a prostate, ask your care team if a prostate cancer screening test (PSA) at age 55 is right for you.  Lung cancer screening: If you are a current or former smoker ages 50 to 80, ask your care team if ongoing lung cancer screenings are right for you.  For informational purposes only. Not to replace the advice of your health care provider. Copyright   2023 [employer]. All rights reserved. Clinically reviewed by the [facility] Transitions Program. [employer] 879903 - REV 01/24.    Thank you so much or choosing [facility]  for your  "Health Care. It was a pleasure seeing you at your visit today! Please contact us with any questions or concerns you may have.                   Maribeth Sanches MD                              To reach your [facility] care team after hours call:   [phone] press #[phone] \"to speak with your care team\".  This will get you to our clinic instead of routing to central [facility]  scheduling.     PLEASE NOTE OUR HOURS HAVE CHANGED secondary to COVID-19 coronavirus pandemic, as we are trying to minimize patient exposure to the virus,  which is now widespread in the [facility].  These hours may change with very little notice.  We apologize for any inconvenience.       Our current clinic hours are:          Monday- Thursday   7:00am - 6:00pm  in person.      Friday  7:00am- 5:00pm                       Saturday and Sunday : Closed to in person and virtual visits        We have telephone and virtual visit times available between    7:00am - 6pm on Monday-Friday as well.                                                Phone:  [phone]      Our pharmacy hours: Monday through Friday 8:00am to 5:00pm                        Saturday - 9:00 am to 12 noon       Sunday : Closed.              Phone:  [phone]              ###  Please note: at this time we are not accepting any walk-in visits. ###      There is also information available at our web site:  [email or web address]    If your provider ordered any lab tests and you do not receive the results within 10 business days, please call the clinic.    If you need a medication refill please contact your pharmacy.  Please allow 3 business days for your refill to be completed.    Our clinic offers telephone visits and e visits.  Please ask one of your team members to explain more.      Use [email or web address] (secure email communication and access to your chart) to send your primary care provider a message or make an appointment. Ask someone on your Team how to " sign up for MyChart.

## 2025-03-06 NOTE — PROGRESS NOTES
Preventive Care Visit  [facility]  Maribeth Sanches MD, Family Medicine  Mar 6, 2025      Assessment & Plan :       ICD-10-CM    1. Routine general medical examination at a health care facility  Z00.00 PRIMARY CARE FOLLOW-UP SCHEDULING      2. Family history of colon cancer - MGF, PGM, PGF, paternal aunt - aunt dx'd at age 47 and  age 48  Z80.0 Adult Oncology/Hematology  Referral      3. Screen for colon cancer  Z12.11 Colonoscopy Screening  Referral      4. Encounter for gynecological examination without abnormal finding- [facility]  Z01.419       5. Family history of breast cancer - mother at age 72  Z80.3 Adult Oncology/Hematology  Referral      6. Need for hepatitis C screening test  Z11.59       7. CARDIOVASCULAR SCREENING; LDL GOAL LESS THAN 160  Z13.6 PRIMARY CARE FOLLOW-UP SCHEDULING     TSH with free T4 reflex     Lipid panel reflex to direct LDL Fasting     Comprehensive metabolic panel     CBC with platelets      8. Encounter for hepatitis C screening test for low risk patient  Z11.59 Hepatitis C Screen Reflex to HCV RNA Quant and Genotype          Please, call our clinic or go to the ER immediately if signs or symptoms worsen or fail to improve as anticipated.     Patient has been advised of split billing requirements and indicates understanding: Yes    Return in about 1 year (around 3/6/2026) for Wellness/Preventative Visit, w/ Dr. KEY for 40 min appt.     Assessment & Plan  - Routine general medical examination: Annual preventative visit done. No concerns. Follow-up in one year if no issues.  - Family history of colon cancer: Strong family history noted. Referral for colonoscopy. Consider genetic cancer counseling at the [facility].  - Screen for colon cancer: Screening recommended due to family history. Baseline colonoscopy to be scheduled.  will contact her.  - Encounter for  "gynecological examination: Recent Pap smear in January 2025 was normal. Continue routine gynecological care.  - Family history of breast cancer: Family history noted in mother. Consider genetic cancer counseling.  - Need for hepatitis C screening test: Hepatitis C screening recommended as per CDC guidelines. Perform screening with current lab work.  - Cardiovascular screening: LDL goal less than 160. Perform fasting lipids test as part of lab work.      Counseling  Appropriate preventive services were addressed with this patient via screening, questionnaire, or discussion as appropriate for fall prevention, nutrition, physical activity, Tobacco-use cessation, social engagement, weight loss and cognition.  Checklist reviewing preventive services available has been given to the patient.  Reviewed patient's diet, addressing concerns and/or questions.   The patient was instructed to see the dentist every 6 months.       MEDICATIONS:   No orders of the defined types were placed in this encounter.        - Continue other medications without change  Regular exercise  See Patient Instructions    The longitudinal plan of care for the diagnosis(es)/condition(s) as documented were addressed during this visit. Due to the added complexity in care, I will continue to support Kusum in the subsequent management and with ongoing continuity of care.    \"Consent was obtained from the patient to use an AI scribe/documentation tool in the creation of this note.This note/dictation was performed and completed with the assistance of voice recognition software and an AI scribe. It may contain inadvertant transcription  errors,  omissions and/or  inadvertent word substitution.\" --Maribeth Sanches MD             Subjective :   Kusum is a 44 year old, presenting for the following:  Physical  and the following other medical problems:      1. Routine general medical examination at a health care facility    2. Family history of colon " cancer    3. Screen for colon cancer    4. Encounter for gynecological examination without abnormal finding- [facility]    5. Family history of breast cancer - mother at age 72    6. Need for hepatitis C screening test    7. CARDIOVASCULAR SCREENING; LDL GOAL LESS THAN 160            3/6/2025     9:13 AM   Additional Questions   Roomed by Mayda WILSON MA        Via the Health Maintenance questionnaire, the patient has reported the following services have been completed -Cervical Cancer Screening: terrell ortegagydigna 2025-01-10, this information has been sent to the abstraction team.    HPI  Vitals are winl. Patient has no specific concerns for this visit.     Please abstract the following data from this visit with this patient into the appropriate field in Epic    Pap smear done on this date: 2025 (approximately), by [facility] group[facility] , results were normal. Hx of endometrial cells in  - resolved with D&C and short-term Mirena IUD .    History of Present Illness-  Kusum Smith, 44 years    Postpartum and Obstetric History  She delivered a baby girl named Marina on November 10, 2023, via  after 48 hours of labor due to cephalopelvic disproportion. The pregnancy was like that of a younger woman, and she was 43 years old at delivery. She was induced and had a long time before full dilation. She is no longer breastfeeding.    Family History of Cancer  She has a strong family history of colon cancer. Her paternal aunt, Karol,  at 48 from colon cancer. Both paternal grandparents and her maternal grandfather also had colon cancer but did not die from it. Her mother had breast cancer at 72 and is doing well without chemotherapy or radiation. Her maternal grandmother had cervical cancer and  from it.    Gynecological History  She had a biopsy in  due to abnormal cells, followed by a dilation and curettage (D&C) which showed no further issues. She  used a Mirena IUD for a short time during IVF treatment, which was removed before embryo transfer. A recent pap  in January 2025 was normal. No history of abnormal Pap smears since the D&C.    Weight History:   She lost about 35 pounds over 10 months, noted during a previous visit. This weight loss was due to postpartum changes after pregnancy and delivery.     Immunization History  She got a flu shot on September 7, 2024, and a Tdap vaccine on August 22, 2023, during pregnancy. She also received an RSV vaccine on October 24, 2024.    Advance Care Planning  Patient does not have a Health Care Directive: Discussed advance care planning with patient; however, patient declined at this time.      3/6/2025   General Health   How would you rate your overall physical health? Good   Feel stress (tense, anxious, or unable to sleep) Not at all         3/6/2025   Nutrition   Three or more servings of calcium each day? Yes   Diet: Regular (no restrictions)   How many servings of fruit and vegetables per day? (!) 2-3   How many sweetened beverages each day? 0-1         3/6/2025   Exercise   Days per week of moderate/strenous exercise 4 days         3/6/2025   Social Factors   Frequency of gathering with friends or relatives Three times a week   Worry food won't last until get money to buy more No   Food not last or not have enough money for food? No   Do you have housing? (Housing is defined as stable permanent housing and does not include staying ouside in a car, in a tent, in an abandoned building, in an overnight shelter, or couch-surfing.) Yes   Are you worried about losing your housing? No   Lack of transportation? No   Unable to get utilities (heat,electricity)? No         3/6/2025   Dental   Dentist two times every year? (!) NO            Today's PHQ-2 Score:       3/6/2025     9:05 AM   PHQ-2 ( 1999 [employer])   Q1: Little interest or pleasure in doing things 0   Q2: Feeling down, depressed or hopeless 0   PHQ-2 Score 0     Q1: Little interest or pleasure in doing things Not at all   Q2: Feeling down, depressed or hopeless Not at all   PHQ-2 Score 0       Patient-reported     PHQ-9 Patient Health Questionaire: Over the last 2 weeks, how often have you been bothered by any of the following problems? -- -- -- --   1. Little interest or pleasure in doing things -- -- -- Not at all   2. Feeling down, depressed, or hopeless -- -- -- Not at all   3. Trouble falling or staying asleep, or sleeping too much -- -- -- Several days   4. Feeling tired or having little energy -- -- -- Several days   5. Poor appetite or overeating -- -- -- Not at all   6. Feeling bad about yourself - or that you are a failure or have let yourself or your family down -- -- -- Not at all   7. Trouble concentrating on things, such as reading the newspaper or watching television -- -- -- Not at all   8. Moving or speaking so slowly that other people could have noticed. Or the opposite - being so fidgety or restless that you have been moving around a lot more than usual -- -- -- Not at all   9. Thoughts that you would be better off dead, or of hurting yourself in some way -- -- -- Not at all   PHQ-9 Total Score -- -- -- 2   If you checked off any problems, how difficult have these problems made it for you to do your work, take care of things at home, or get along with other people? -- -- -- Not difficult at all   CLARENCE-7 Anxiety ([employer], 2002; Used with Permission) Over the LAST 2 WEEKS, how often have you been bothered by the following problems?     1. Feeling nervous, anxious, or on edge -- -- -- Not at all   2. Not being able to stop or control worrying -- -- -- Not at all   3. Worrying too much about different things -- -- -- Not at all   4. Trouble relaxing -- -- -- Not at all   5. Being so restless that it is hard to sit still -- -- -- Not at all   6. Becoming easily annoyed or irritable -- -- -- Not at all   7. Feeling afraid, as if something awful might happen  -- -- -- Not at all   CLARENCE-7 Total Score -- -- -- 0   If you checked any problems, how difficult have they made it for you to do your work, take care of things at home, or get along with other people? -- -- -- Not difficult at all         3/6/2025   Substance Use   Alcohol more than 3/day or more than 7/wk No   Do you use any other substances recreationally? No     Social History     Tobacco Use    Smoking status: Never     Passive exposure: Never    Smokeless tobacco: Never   Vaping Use    Vaping status: Never Used   Substance Use Topics    Alcohol use: Not Currently     Alcohol/week: 3.3 standard drinks of alcohol     Comment: 2 per week avg    Drug use: No           2/26/2025   LAST FHS-7 RESULTS   1st degree relative breast or ovarian cancer Yes   Any relative bilateral breast cancer No   Any male have breast cancer No   Any ONE woman have BOTH breast AND ovarian cancer No   Any woman with breast cancer before 50yrs No   2 or more relatives with breast AND/OR ovarian cancer No   2 or more relatives with breast AND/OR bowel cancer No        Mammogram Screening - Mammogram every 1-2 years updated in Health Maintenance based on mutual decision making        3/6/2025   STI Screening   New sexual partner(s) since last STI/HIV test? No     History of abnormal Pap smear: YES - age 30-64 HPV with reflex Pap every 3 years recommended [facility] - normal pap 1/2025.  Hx of endometrial cells in 2022 - treated with D&C  = normal and Mirena IUD for several months.  Normal pap at first prenatal visit in 3/2023.          Latest Ref Rng & Units 11/7/2018    11:10 AM 11/7/2018    11:09 AM 4/27/2010    12:00 AM   PAP / HPV   PAP (Historical)   NIL  NIL    HPV 16 DNA NEG^Negative Negative      HPV 18 DNA NEG^Negative Negative      Other HR HPV NEG^Negative Negative        ASCVD Risk   The ASCVD Risk score (Carlos DK, et al., 2019) failed to calculate for the following reasons:    Cannot find a previous HDL  lab    Cannot find a previous total cholesterol lab        3/6/2025   Contraception/Family Planning   Questions about contraception or family planning No        Reviewed and updated as needed this visit by Provider                    Past Medical History:   Diagnosis Date    NO ACTIVE PROBLEMS     Perioral dermatitis 2016     Past Surgical History:   Procedure Laterality Date     SECTION N/A 11/10/2023    Procedure:  section;  Surgeon: Sofie Catalan MD;  Location:  L+D    dilation and currettage       for abnormal pap /endometrial cells - resolved    wisdom teeth N/A     age 21     OB History    Para Term  AB Living   1 1 1 0 0 1   SAB IAB Ectopic Multiple Live Births   0 0 0 0 1      # Outcome Date GA Lbr Thom/2nd Weight Sex Type Anes PTL Lv   1 Term 11/10/23 39w2d  3.72 kg (8 lb 3.2 oz) F CS-LTranv EPI N EDI      Complications: Failure to Progress in Second Stage      Name: Marina Gardiner      Apgar1: 8  Apgar5: 9      Obstetric Comments   CS - for cephalopelvic disproportion after 48 hours of labor and multiple hours of full dilation - born after IVF      Lab work is in process  Labs reviewed in EPIC  BP Readings from Last 3 Encounters:   25 114/82   24 116/62   23 126/76    Wt Readings from Last 3 Encounters:   25 43.5 kg (96 lb)   24 44.7 kg (98 lb 9.6 oz)   23 60.6 kg (133 lb 8 oz)                  Patient Active Problem List   Diagnosis    Family history of thyroid disease    Family history of colon cancer - MGF, PGM, PGF, paternal aunt - aunt dx'd at age 47 and  age 48    Encounter for gynecological examination without abnormal finding- [facility]    Family history of breast cancer - mother at age 72    History of abnormal cervical Pap smear-: YES done [facility] - normal pap 2025. Hx of endometrial cells in  - treated with D&C = normal & Mirena IUD - normal  paps x 3 since     Past Surgical History:   Procedure Laterality Date     SECTION N/A 11/10/2023    Procedure:  section;  Surgeon: Sofie Catalan MD;  Location:  L+D    dilation and currettage       for abnormal pap /endometrial cells - resolved    wisdom teeth N/A     age 21       Social History     Tobacco Use    Smoking status: Never     Passive exposure: Never    Smokeless tobacco: Never   Substance Use Topics    Alcohol use: Not Currently     Alcohol/week: 3.3 standard drinks of alcohol     Comment: 2 per week avg     Family History   Problem Relation Age of Onset    Thyroid Disease Mother         hyperthyroid    Breast Cancer Mother 72        treated with surgery only    Heart Disease Father 64        MI at age 64     Thyroid Disease Sister 33        hypothyroid    Cervical Cancer Maternal Grandmother     Colon Cancer Maternal Grandfather     Colon Cancer Paternal Grandmother     C.A.D. Paternal Grandfather     Colon Cancer Paternal Grandfather     Cancer - colorectal Paternal Aunt 47         at age 48         Current Outpatient Medications   Medication Sig Dispense Refill    oxyCODONE (ROXICODONE) 5 MG tablet Take 1 tablet (5 mg) by mouth every 4 hours as needed for moderate to severe pain (Patient not taking: Reported on 3/6/2025) 15 tablet 0    Prenatal Vit-Fe Fumarate-FA (PNV PRENATAL PLUS MULTIVITAMIN) 27-1 MG TABS per tablet Take 1 tablet by mouth daily (Patient not taking: Reported on 3/6/2025)       Allergies   Allergen Reactions    Erythromycin     Metronidazole      Oral = Rash and burning, topical = fine      Recent Labs   Lab Test 18  1017   *   HDL 98   TRIG 71   ALT 16   CR 0.82   GFRESTIMATED 77   GFRESTBLACK >90   POTASSIUM 4.2   TSH 1.53      Results  - Mammogram: Completed last week  - Pap smear: Completed 2025, results normal  - COVID shot: Received in 2024  - Influenza vaccine: Received 2024  - Tdap vaccine: Received August  "22, 2023  - RSV vaccine: Received October 24, 2024        Review of Systems  Constitutional, HEENT, cardiovascular, pulmonary, GI, , musculoskeletal, neuro, skin, endocrine and psych systems are negative, except as otherwise noted.     Objective    Exam  /82   Pulse 119   Temp 97.2  F (36.2  C) (Tympanic)   Resp 16   Ht 1.632 m (5' 4.25\")   Wt 43.5 kg (96 lb)   LMP 03/05/2025 (Exact Date)   SpO2 99%   BMI 16.35 kg/m     Estimated body mass index is 16.35 kg/m  as calculated from the following:    Height as of this encounter: 1.632 m (5' 4.25\").    Weight as of this encounter: 43.5 kg (96 lb).    Physical Exam  GENERAL: alert and no distress  EYES: Eyes grossly normal to inspection, PERRL and conjunctivae and sclerae normal  HENT: ear canals and TM's normal, nose and mouth without ulcers or lesions  NECK: no adenopathy, no asymmetry, masses, or scars  RESP: lungs clear to auscultation - no rales, rhonchi or wheezes  CV: regular rate and rhythm, normal S1 S2, no S3 or S4, no murmur, click or rub, no peripheral edema  ABDOMEN: soft, nontender, no hepatosplenomegaly, no masses and bowel sounds normal  MS: no gross musculoskeletal defects noted, no edema  SKIN: no suspicious lesions or rashes  NEURO: Normal strength and tone, mentation intact and speech normal  PSYCH: mentation appears normal, affect normal/bright    Breast, pelvic and pap exams done 1/20[facility] - Dr Isra Wolf.     Signed Electronically by: Maribeth Sanches MD  "

## 2025-03-06 NOTE — Clinical Note
Please abstract the following data from this visit with this patient into the appropriate field in Epic:  Tests that can be patient reported without a hard copy:  Pap smear done on this date: 1/2025 (approximately), by [facility] group[facility] , results were normal. Hx of endometrial cells in 2022 - resolved with D&C and short-term Mirena IUD .   Other Tests found in the patient's chart through Chart Review/Care Everywhere:  {Abstract Quality List (Optional):745720}  Note to Abstraction: If this section is blank, no results were found via Chart Review/Care Everywhere.

## 2025-03-18 ENCOUNTER — TELEPHONE (OUTPATIENT)
Dept: GASTROENTEROLOGY | Facility: CLINIC | Age: 45
End: 2025-03-18
Payer: COMMERCIAL

## 2025-03-18 NOTE — TELEPHONE ENCOUNTER
"Endoscopy Scheduling Screen    Have you had any respiratory illness or flu-like symptoms in the last 10 days?  No    What is your communication preference for Instructions and/or Bowel Prep?   MyChart    What insurance is in the chart?  Other:      Ordering/Referring Provider:   SAI FONSECA        (If ordering provider performs procedure, schedule with ordering provider unless otherwise instructed. )    BMI: Estimated body mass index is 16.35 kg/m  as calculated from the following:    Height as of 3/6/25: 1.632 m (5' 4.25\").    Weight as of 3/6/25: 43.5 kg (96 lb).     Sedation Ordered  moderate sedation.   If patient BMI > 50 do not schedule in [facility].    If patient BMI > 45 do not schedule at [facility].    Are you taking methadone or Suboxone?  NO, No RN review required.    Have you been diagnosed and are being treated for severe PTSD or severe anxiety?  NO, No RN review required.    Are you taking any prescription medications for pain 3 or more times per week?   NO, No RN review required.    Do you have a history of malignant hyperthermia?  No    (Females) Are you currently pregnant?   No     Have you been diagnosed or told you have pulmonary hypertension?   No    Do you have an LVAD?  No    Have you been told you have moderate to severe sleep apnea?  No.    Have you been told you have COPD, asthma, or any other lung disease?  No    Has your doctor ordered any cardiac tests like echo, angiogram, stress test, ablation, or EKG, that you have not completed yet?  No    Do you  have a history of any heart conditions?  No     Have you ever had or are you waiting for an organ transplant?  No. Continue scheduling, no site restrictions.    Have you had a stroke or transient ischemic attack (TIA aka \"mini stroke\") in the last 2 years?   No.    Have you been diagnosed with or been told you have cirrhosis of the liver?   No.    Are you currently on dialysis?   No    Do you need assistance transferring?   No    BMI: " "Estimated body mass index is 16.35 kg/m  as calculated from the following:    Height as of 3/6/25: 1.632 m (5' 4.25\").    Weight as of 3/6/25: 43.5 kg (96 lb).     Is patients BMI > 40 and scheduling location [facility]?  No    Do you take an injectable or oral medication for weight loss or diabetes (excluding insulin)?  No    Do you take the medication Naltrexone?  No    Do you take blood thinners?  No       Prep   Are you currently on dialysis or do you have chronic kidney disease?  No    Do you have a diagnosis of diabetes?  No    Do you have a diagnosis of cystic fibrosis (CF)?  No    On a regular basis do you go 3 -5 days between bowel movements?  No    BMI > 40?  No    Preferred Pharmacy:    [id number] [address] AT [place] [address] & [address]  [facility] [address]  Phone: [phone] Fax: [phone]    Final Scheduling Details     Procedure scheduled  Colonoscopy    Surgeon:  LAZARO     Date of procedure:  03/28/2025     Pre-OP / PAC:   No - Not required for this site.    Location  [facility] - Per order.    Sedation   Moderate Sedation - Per order.      Patient Reminders:   You will receive a call from a Nurse to review instructions and health history.  This assessment must be completed prior to your procedure.  Failure to complete the Nurse assessment may result in the procedure being cancelled.      On the day of your procedure, please designate an adult(s) who can drive you home stay with you for the next 24 hours. The medicines used in the exam will make you sleepy. You will not be able to drive.      You cannot take public transportation, ride share services, or non-medical taxi service without a responsible caregiver.  Medical transport services are allowed with the requirement that a responsible caregiver will receive you at your destination.  We require that drivers and caregivers are confirmed prior to your procedure.  "

## 2025-03-19 ENCOUNTER — TELEPHONE (OUTPATIENT)
Dept: GASTROENTEROLOGY | Facility: CLINIC | Age: 45
End: 2025-03-19
Payer: COMMERCIAL

## 2025-03-19 NOTE — TELEPHONE ENCOUNTER
Pre visit planning completed.      Procedure details:    Patient scheduled for Colonoscopy on 3/28/25.     Arrival time: 0900. Procedure time 0945    Facility location: [facility]; [address] [place] [address]. Check in location: Main entrance, door #1 on the North side of the building under roundabout awning. DO NOT GO TO SURGERY/ED ENTRANCE.     Sedation type: Conscious sedation     Pre op exam needed? No.    Indication for procedure: screening       Chart review:     Electronic implanted devices? No    Recent diagnosis of diverticulitis within the last 6 weeks? No      Medication review:    Diabetic? No    Anticoagulants? No    Weight loss medication/injectable? No GLP-1 medication per patient's medication list. Nursing to verify with pre-assessment call.    Other medication HOLDING recommendations:  N/A      Prep for procedure:     Bowel prep recommendation: Standard Miralax.   Due to: standard bowel prep    Procedure information and instructions sent via [employer]         Carolina Childs RN  Endoscopy Procedure Pre Assessment   [phone] option 3

## 2025-03-19 NOTE — TELEPHONE ENCOUNTER
Pre assessment completed for upcoming procedure.   (Please see previous telephone encounter notes for complete details)           Procedure details:    Arrival time and facility location reviewed.    Pre op exam needed? No.    Designated  policy reviewed. Instructed to have someone stay 6  hours post procedure.       Medication review:    Medications reviewed. Please see supporting documentation below. Holding recommendations discussed (if applicable).       Prep for procedure:     Procedure prep instructions reviewed.        Any additional information needed:  N/A      Patient verbalized understanding and had no questions or concerns at this time.      Ayesha Hernandez LPN  Endoscopy Procedure Pre Assessment   [phone] option 3

## 2025-03-28 ENCOUNTER — HOSPITAL ENCOUNTER (OUTPATIENT)
Facility: CLINIC | Age: 45
Discharge: HOME | End: 2025-03-28
Attending: INTERNAL MEDICINE | Admitting: INTERNAL MEDICINE
Payer: COMMERCIAL

## 2025-03-28 VITALS
TEMPERATURE: 97.5 F | RESPIRATION RATE: 15 BRPM | WEIGHT: 96 LBS | OXYGEN SATURATION: 98 % | SYSTOLIC BLOOD PRESSURE: 97 MMHG | DIASTOLIC BLOOD PRESSURE: 64 MMHG | HEART RATE: 72 BPM | BODY MASS INDEX: 16.35 KG/M2

## 2025-03-28 LAB — COLONOSCOPY: NORMAL

## 2025-03-28 PROCEDURE — G0500 MOD SEDAT ENDO SERVICE >5YRS: HCPCS | Performed by: INTERNAL MEDICINE

## 2025-03-28 PROCEDURE — 250N000011 HC RX IP 250 OP 636: Performed by: INTERNAL MEDICINE

## 2025-03-28 PROCEDURE — G0121 COLON CA SCRN NOT HI RSK IND: HCPCS | Performed by: INTERNAL MEDICINE

## 2025-03-28 PROCEDURE — G0105 COLORECTAL SCRN; HI RISK IND: HCPCS | Performed by: INTERNAL MEDICINE

## 2025-03-28 RX ORDER — NALOXONE HYDROCHLORIDE 0.4 MG/ML
0.2 INJECTION, SOLUTION INTRAMUSCULAR; INTRAVENOUS; SUBCUTANEOUS
Status: DISCONTINUED | OUTPATIENT
Start: 2025-03-28 | End: 2025-03-28 | Stop reason: HOSPADM

## 2025-03-28 RX ORDER — ONDANSETRON 2 MG/ML
4 INJECTION INTRAMUSCULAR; INTRAVENOUS
Status: DISCONTINUED | OUTPATIENT
Start: 2025-03-28 | End: 2025-03-28 | Stop reason: HOSPADM

## 2025-03-28 RX ORDER — DIPHENHYDRAMINE HYDROCHLORIDE 50 MG/ML
25-50 INJECTION, SOLUTION INTRAMUSCULAR; INTRAVENOUS
Status: DISCONTINUED | OUTPATIENT
Start: 2025-03-28 | End: 2025-03-28 | Stop reason: HOSPADM

## 2025-03-28 RX ORDER — FLUMAZENIL 0.1 MG/ML
0.2 INJECTION, SOLUTION INTRAVENOUS
Status: DISCONTINUED | OUTPATIENT
Start: 2025-03-28 | End: 2025-03-28 | Stop reason: HOSPADM

## 2025-03-28 RX ORDER — FENTANYL CITRATE 50 UG/ML
25-100 INJECTION, SOLUTION INTRAMUSCULAR; INTRAVENOUS EVERY 5 MIN PRN
Status: DISCONTINUED | OUTPATIENT
Start: 2025-03-28 | End: 2025-03-28 | Stop reason: HOSPADM

## 2025-03-28 RX ORDER — NALOXONE HYDROCHLORIDE 0.4 MG/ML
0.4 INJECTION, SOLUTION INTRAMUSCULAR; INTRAVENOUS; SUBCUTANEOUS
Status: DISCONTINUED | OUTPATIENT
Start: 2025-03-28 | End: 2025-03-28 | Stop reason: HOSPADM

## 2025-03-28 RX ORDER — ONDANSETRON 4 MG/1
4 TABLET, ORALLY DISINTEGRATING ORAL EVERY 6 HOURS PRN
Status: DISCONTINUED | OUTPATIENT
Start: 2025-03-28 | End: 2025-03-28 | Stop reason: HOSPADM

## 2025-03-28 RX ORDER — ATROPINE SULFATE 0.1 MG/ML
1 INJECTION INTRAVENOUS
Status: DISCONTINUED | OUTPATIENT
Start: 2025-03-28 | End: 2025-03-28 | Stop reason: HOSPADM

## 2025-03-28 RX ORDER — LIDOCAINE 40 MG/G
CREAM TOPICAL
Status: DISCONTINUED | OUTPATIENT
Start: 2025-03-28 | End: 2025-03-28 | Stop reason: HOSPADM

## 2025-03-28 RX ORDER — ONDANSETRON 2 MG/ML
4 INJECTION INTRAMUSCULAR; INTRAVENOUS EVERY 6 HOURS PRN
Status: DISCONTINUED | OUTPATIENT
Start: 2025-03-28 | End: 2025-03-28 | Stop reason: HOSPADM

## 2025-03-28 RX ORDER — SIMETHICONE 40MG/0.6ML
133 SUSPENSION, DROPS(FINAL DOSAGE FORM)(ML) ORAL
Status: DISCONTINUED | OUTPATIENT
Start: 2025-03-28 | End: 2025-03-28 | Stop reason: HOSPADM

## 2025-03-28 RX ORDER — EPINEPHRINE 1 MG/ML
0.1 INJECTION, SOLUTION, CONCENTRATE INTRAVENOUS
Status: DISCONTINUED | OUTPATIENT
Start: 2025-03-28 | End: 2025-03-28 | Stop reason: HOSPADM

## 2025-03-28 RX ORDER — PROCHLORPERAZINE MALEATE 10 MG
10 TABLET ORAL EVERY 6 HOURS PRN
Status: DISCONTINUED | OUTPATIENT
Start: 2025-03-28 | End: 2025-03-28 | Stop reason: HOSPADM

## 2025-03-28 RX ADMIN — FENTANYL CITRATE 50 MCG: 50 INJECTION, SOLUTION INTRAMUSCULAR; INTRAVENOUS at 09:59

## 2025-03-28 RX ADMIN — MIDAZOLAM 1 MG: 1 INJECTION INTRAMUSCULAR; INTRAVENOUS at 09:59

## 2025-03-28 RX ADMIN — MIDAZOLAM 1 MG: 1 INJECTION INTRAMUSCULAR; INTRAVENOUS at 10:04

## 2025-03-28 RX ADMIN — MIDAZOLAM 1 MG: 1 INJECTION INTRAMUSCULAR; INTRAVENOUS at 10:07

## 2025-03-28 RX ADMIN — FENTANYL CITRATE 50 MCG: 50 INJECTION, SOLUTION INTRAMUSCULAR; INTRAVENOUS at 10:04

## 2025-03-28 RX ADMIN — FENTANYL CITRATE 50 MCG: 50 INJECTION, SOLUTION INTRAMUSCULAR; INTRAVENOUS at 10:07

## 2025-03-28 ASSESSMENT — ACTIVITIES OF DAILY LIVING (ADL)
ADLS_ACUITY_SCORE: 56

## 2025-03-28 NOTE — H&P
Pre-Endoscopy History and Physical     Anna Smith MRN# [id number]   YOB: 1980 Age: 44 year old     Date of Procedure: 3/28/2025  Primary care provider: Maribeth Sanches  Type of Endoscopy: Colonoscopy with possible biopsy, possible polypectomy  Reason for Procedure: screen  Type of Anesthesia Anticipated: Conscious Sedation    HPI:    Anna is a 44 year old female who will be undergoing the above procedure.      A history and physical has been performed. The patient's medications and allergies have been reviewed. The risks and benefits of the procedure and the sedation options and risks were discussed with the patient.  All questions were answered and informed consent was obtained.      She denies a personal or family history of anesthesia complications or bleeding disorders.     Patient Active Problem List   Diagnosis    Family history of thyroid disease    Family history of colon cancer - MGF, PGM, PGF, paternal aunt - aunt dx'd at age 47 and  age 48    Encounter for gynecological examination without abnormal finding- [facility]    Family history of breast cancer - mother at age 72    History of abnormal cervical Pap smear-: YES [facility] - normal pap 2025. Hx of endometrial cells in  - treated with D&C = normal & Mirena IUD - normal paps x 3 since        Past Medical History:   Diagnosis Date    NO ACTIVE PROBLEMS     Perioral dermatitis 2016        Past Surgical History:   Procedure Laterality Date     SECTION N/A 11/10/2023    Procedure:  section;  Surgeon: Sofie Catalan MD;  Location:  [facility]    dilation and currettage       for abnormal pap /endometrial cells - resolved    wisdom teeth N/A     age 21       Social History     Tobacco Use    Smoking status: Never     Passive exposure: Never    Smokeless tobacco: Never   Substance Use Topics    Alcohol use: Not Currently      "Alcohol/week: 3.3 standard drinks of alcohol     Comment: 2 per week avg       Family History   Problem Relation Age of Onset    Thyroid Disease Mother         hyperthyroid    Breast Cancer Mother 72        treated with surgery only    Heart Disease Father 64        MI at age 64     Thyroid Disease Sister 33        hypothyroid    Cervical Cancer Maternal Grandmother     Colon Cancer Maternal Grandfather     Colon Cancer Paternal Grandmother     C.A.D. Paternal Grandfather     Colon Cancer Paternal Grandfather     Cancer - colorectal Paternal Aunt 47         at age 48       Prior to Admission medications    Medication Sig Start Date End Date Taking? Authorizing Provider   oxyCODONE (ROXICODONE) 5 MG tablet Take 1 tablet (5 mg) by mouth every 4 hours as needed for moderate to severe pain  Patient not taking: Reported on 3/6/2025 11/13/23   Juancarlos Le MD   Prenatal Vit-Fe Fumarate-FA (PNV PRENATAL PLUS MULTIVITAMIN) 27-1 MG TABS per tablet Take 1 tablet by mouth daily  Patient not taking: Reported on 3/6/2025    Reported, Patient       Allergies   Allergen Reactions    Erythromycin     Metronidazole      Oral = Rash and burning, topical = fine         REVIEW OF SYSTEMS:   5 point ROS negative except as noted above in HPI, including Gen., Resp., CV, GI &  system review.    PHYSICAL EXAM:   LMP 2025 (Exact Date)  Estimated body mass index is 16.35 kg/m  as calculated from the following:    Height as of 3/6/25: 1.632 m (5' 4.25\").    Weight as of 3/6/25: 43.5 kg (96 lb).   GENERAL APPEARANCE: alert, and oriented  MENTAL STATUS: alert  AIRWAY EXAM: Mallampatti Class I (visualization of the soft palate, fauces, uvula, anterior and posterior pillars)  RESP: lungs clear to auscultation - no rales, rhonchi or wheezes  CV: regular rates and rhythm  DIAGNOSTICS:    Not indicated    IMPRESSION   ASA Class 1 - Healthy patient, no medical problems    PLAN:   Plan for Colonoscopy with possible biopsy, " possible polypectomy. We discussed the risks, benefits and alternatives and the patient wished to proceed.    The above has been forwarded to the consulting provider.      Signed Electronically by: Kevin Montaño MD  March 28, 2025

## 2025-06-26 NOTE — PROGRESS NOTES
Kusum feeling contraction pain, she has been using epidural PCA but no longer working.  Notified Anesthesia, and they will come to eval for redose.     Erin Mora

## (undated) DEVICE — LIGHT HANDLE X2

## (undated) DEVICE — SOL WATER IRRIG 1000ML BOTTLE 07139-09

## (undated) DEVICE — LINEN TOWEL PACK X5 5464

## (undated) DEVICE — PREP CHLORAPREP 26ML TINTED ORANGE  260815

## (undated) DEVICE — LINEN BABY BLANKET 5434

## (undated) DEVICE — DRAPE SHEET REV FOLD 3/4 9349

## (undated) DEVICE — DRSG KERLIX FLUFFS X5

## (undated) DEVICE — PACK SET-UP STD 9102

## (undated) DEVICE — SUCTION CANISTER MEDIVAC LINER 3000ML W/LID 65651-530

## (undated) DEVICE — SOL NACL 0.9% IRRIG 1000ML BOTTLE 07138-09

## (undated) DEVICE — PAD CHUX UNDERPAD 23X24" 7136

## (undated) DEVICE — ESU GROUND PAD UNIVERSAL W/O CORD

## (undated) DEVICE — STPL SKIN PROXIMATE 35 WIDE PMW35

## (undated) DEVICE — BLADE CLIPPER 4406

## (undated) DEVICE — PACK C-SECTION LF PL15OTA83B

## (undated) RX ORDER — LIDOCAINE HCL/EPINEPHRINE/PF 2%-1:200K
VIAL (ML) INJECTION
Status: DISPENSED
Start: 2023-11-10

## (undated) RX ORDER — MORPHINE SULFATE 1 MG/ML
INJECTION, SOLUTION EPIDURAL; INTRATHECAL; INTRAVENOUS
Status: DISPENSED
Start: 2023-11-10

## (undated) RX ORDER — FENTANYL CITRATE 50 UG/ML
INJECTION, SOLUTION INTRAMUSCULAR; INTRAVENOUS
Status: DISPENSED
Start: 2023-11-10

## (undated) RX ORDER — FENTANYL CITRATE 50 UG/ML
INJECTION, SOLUTION INTRAMUSCULAR; INTRAVENOUS
Status: DISPENSED
Start: 2025-03-28

## (undated) RX ORDER — ONDANSETRON 2 MG/ML
INJECTION INTRAMUSCULAR; INTRAVENOUS
Status: DISPENSED
Start: 2023-11-10

## (undated) RX ORDER — KETOROLAC TROMETHAMINE 30 MG/ML
INJECTION, SOLUTION INTRAMUSCULAR; INTRAVENOUS
Status: DISPENSED
Start: 2023-11-10